# Patient Record
Sex: MALE | Race: BLACK OR AFRICAN AMERICAN | NOT HISPANIC OR LATINO | Employment: OTHER | URBAN - METROPOLITAN AREA
[De-identification: names, ages, dates, MRNs, and addresses within clinical notes are randomized per-mention and may not be internally consistent; named-entity substitution may affect disease eponyms.]

---

## 2017-05-25 ENCOUNTER — HISTORICAL (OUTPATIENT)
Dept: ADMINISTRATIVE | Facility: HOSPITAL | Age: 62
End: 2017-05-25

## 2018-04-25 ENCOUNTER — HISTORICAL (OUTPATIENT)
Dept: ADMINISTRATIVE | Facility: HOSPITAL | Age: 63
End: 2018-04-25

## 2018-04-25 LAB
ABS NEUT (OLG): 6.7
ALBUMIN SERPL-MCNC: 4.6 GM/DL (ref 3.4–5)
ALBUMIN/GLOB SERPL: 1.92 {RATIO} (ref 1.5–2.5)
ALP SERPL-CCNC: 73 UNIT/L (ref 38–126)
ALT SERPL-CCNC: 19 UNIT/L (ref 7–52)
AST SERPL-CCNC: 19 UNIT/L (ref 15–37)
BILIRUB SERPL-MCNC: 0.6 MG/DL (ref 0.2–1)
BILIRUBIN DIRECT+TOT PNL SERPL-MCNC: 0.2 MG/DL (ref 0–0.5)
BUN SERPL-MCNC: 19 MG/DL (ref 7–18)
CALCIUM SERPL-MCNC: 9.2 MG/DL (ref 8.5–10)
CHLORIDE SERPL-SCNC: 107 MMOL/L (ref 98–107)
CHOLEST SERPL-MCNC: 114 MG/DL (ref 0–200)
CHOLEST/HDLC SERPL: 3.4 {RATIO}
CO2 SERPL-SCNC: 24 MMOL/L (ref 21–32)
CREAT SERPL-MCNC: 0.77 MG/DL (ref 0.6–1.3)
CREAT UR-MCNC: 200 MG/DL
CREAT/UREA NIT SERPL: 24.7
ERYTHROCYTE [DISTWIDTH] IN BLOOD BY AUTOMATED COUNT: 14.7 % (ref 11.5–17)
EST. AVERAGE GLUCOSE BLD GHB EST-MCNC: 117 MG/DL
GGT SERPL-CCNC: 25 UNIT/L (ref 5–85)
GLOBULIN SER-MCNC: 2.4 GM/DL (ref 1.2–3)
GLUCOSE SERPL-MCNC: 95 MG/DL (ref 74–106)
HBA1C MFR BLD: 5.7 % (ref 4.4–6.4)
HCT VFR BLD AUTO: 40.7 % (ref 42–52)
HDLC SERPL-MCNC: 34 MG/DL (ref 35–60)
HGB BLD-MCNC: 14 GM/DL (ref 14–18)
LDH SERPL-CCNC: 122 UNIT/L (ref 140–271)
LDLC SERPL CALC-MCNC: 58 MG/DL (ref 0–129)
LYMPHOCYTES # BLD AUTO: 2.3 X10(3)/MCL (ref 0.6–3.4)
LYMPHOCYTES NFR BLD AUTO: 23.7 % (ref 13–40)
MCH RBC QN AUTO: 27 PG (ref 27–31.2)
MCHC RBC AUTO-ENTMCNC: 34 GM/DL (ref 32–36)
MCV RBC AUTO: 79 FL (ref 80–94)
MICROALBUMIN UR-MCNC: 10 MG/L
MICROALBUMIN/CREAT RATIO PNL UR: <30 MG/GM
MONOCYTES # BLD AUTO: 0.9 X10(3)/MCL (ref 0–1.8)
MONOCYTES NFR BLD AUTO: 9.2 % (ref 0.1–24)
NEUTROPHILS NFR BLD AUTO: 67.1 % (ref 47–80)
PLATELET # BLD AUTO: 274 X10(3)/MCL (ref 130–400)
PMV BLD AUTO: 10.6 FL
POTASSIUM SERPL-SCNC: 4 MMOL/L (ref 3.5–5.1)
PROT SERPL-MCNC: 7 GM/DL (ref 6.4–8.2)
RBC # BLD AUTO: 5.18 X10(6)/MCL (ref 4.7–6.1)
SODIUM SERPL-SCNC: 137 MMOL/L (ref 136–145)
TRIGL SERPL-MCNC: 48 MG/DL (ref 30–150)
VLDLC SERPL CALC-MCNC: 9.6 MG/DL
WBC # SPEC AUTO: 9.9 X10(3)/MCL (ref 4.5–11.5)

## 2018-05-24 ENCOUNTER — HISTORICAL (OUTPATIENT)
Dept: ADMINISTRATIVE | Facility: HOSPITAL | Age: 63
End: 2018-05-24

## 2018-05-24 LAB
ABS NEUT (OLG): 6.4
ERYTHROCYTE [DISTWIDTH] IN BLOOD BY AUTOMATED COUNT: 15 % (ref 11.5–17)
HCT VFR BLD AUTO: 41.6 % (ref 42–52)
HGB BLD-MCNC: 14 GM/DL (ref 14–18)
LYMPHOCYTES # BLD AUTO: 2.2 X10(3)/MCL (ref 0.6–3.4)
LYMPHOCYTES NFR BLD AUTO: 22 % (ref 13–40)
MCH RBC QN AUTO: 26.5 PG (ref 27–31.2)
MCHC RBC AUTO-ENTMCNC: 34 GM/DL (ref 32–36)
MCV RBC AUTO: 79 FL (ref 80–94)
MONOCYTES # BLD AUTO: 1.2 X10(3)/MCL (ref 0–1.8)
MONOCYTES NFR BLD AUTO: 12.2 % (ref 0.1–24)
NEUTROPHILS NFR BLD AUTO: 65.8 % (ref 47–80)
PLATELET # BLD AUTO: 309 X10(3)/MCL (ref 130–400)
PMV BLD AUTO: 10.1 FL
RBC # BLD AUTO: 5.28 X10(6)/MCL (ref 4.7–6.1)
WBC # SPEC AUTO: 9.8 X10(3)/MCL (ref 4.5–11.5)

## 2019-05-22 ENCOUNTER — HISTORICAL (OUTPATIENT)
Dept: ADMINISTRATIVE | Facility: HOSPITAL | Age: 64
End: 2019-05-22

## 2019-05-22 LAB
ABS NEUT (OLG): 7.1 X10(3)/MCL (ref 2.1–9.2)
ALBUMIN SERPL-MCNC: 4.4 GM/DL (ref 3.4–5)
ALBUMIN/GLOB SERPL: 1.63 {RATIO} (ref 1.5–2.5)
ALP SERPL-CCNC: 69 UNIT/L (ref 38–126)
ALT SERPL-CCNC: 19 UNIT/L (ref 7–52)
APPEARANCE, UA: CLEAR
AST SERPL-CCNC: 25 UNIT/L (ref 15–37)
BACTERIA #/AREA URNS AUTO: NORMAL /HPF
BILIRUB SERPL-MCNC: 0.5 MG/DL (ref 0.2–1)
BILIRUB UR QL STRIP: NEGATIVE MG/DL
BILIRUBIN DIRECT+TOT PNL SERPL-MCNC: 0.2 MG/DL (ref 0–0.5)
BILIRUBIN DIRECT+TOT PNL SERPL-MCNC: 0.3 MG/DL
BUN SERPL-MCNC: 16 MG/DL (ref 7–18)
CALCIUM SERPL-MCNC: 9.2 MG/DL (ref 8.5–10)
CHLORIDE SERPL-SCNC: 105 MMOL/L (ref 98–107)
CHOLEST SERPL-MCNC: 109 MG/DL (ref 0–200)
CHOLEST/HDLC SERPL: 3.1 {RATIO}
CO2 SERPL-SCNC: 26 MMOL/L (ref 21–32)
COLOR UR: YELLOW
CREAT SERPL-MCNC: 0.86 MG/DL (ref 0.6–1.3)
CREAT UR-MCNC: 200 MG/DL
ERYTHROCYTE [DISTWIDTH] IN BLOOD BY AUTOMATED COUNT: 15.2 % (ref 11.5–17)
EST. AVERAGE GLUCOSE BLD GHB EST-MCNC: 111 MG/DL
GLOBULIN SER-MCNC: 2.7 GM/DL (ref 1.2–3)
GLUCOSE (UA): NEGATIVE MG/DL
GLUCOSE SERPL-MCNC: 95 MG/DL (ref 74–106)
HBA1C MFR BLD: 5.5 % (ref 4.4–6.4)
HCT VFR BLD AUTO: 41 % (ref 42–52)
HDLC SERPL-MCNC: 35 MG/DL (ref 35–60)
HGB BLD-MCNC: 14 GM/DL (ref 14–18)
HGB UR QL STRIP: NEGATIVE UNIT/L
KETONES UR QL STRIP: NEGATIVE MG/DL
LDLC SERPL CALC-MCNC: 57 MG/DL (ref 0–129)
LEUKOCYTE ESTERASE UR QL STRIP: NEGATIVE UNIT/L
LYMPHOCYTES # BLD AUTO: 1.9 X10(3)/MCL (ref 0.6–3.4)
LYMPHOCYTES NFR BLD AUTO: 18.5 % (ref 13–40)
MCH RBC QN AUTO: 26.3 PG (ref 27–31.2)
MCHC RBC AUTO-ENTMCNC: 34 GM/DL (ref 32–36)
MCV RBC AUTO: 77 FL (ref 80–94)
MICROALBUMIN UR-MCNC: 10 MG/L
MICROALBUMIN/CREAT RATIO PNL UR: <30 MG/GM
MONOCYTES # BLD AUTO: 1.1 X10(3)/MCL (ref 0.1–1.3)
MONOCYTES NFR BLD AUTO: 11.2 % (ref 0.1–24)
NEUTROPHILS NFR BLD AUTO: 70.3 % (ref 47–80)
NITRITE UR QL STRIP.AUTO: NEGATIVE
PH UR STRIP: 5 [PH]
PLATELET # BLD AUTO: 281 X10(3)/MCL (ref 130–400)
PMV BLD AUTO: 10.3 FL (ref 9.4–12.4)
POTASSIUM SERPL-SCNC: 4.6 MMOL/L (ref 3.5–5.1)
PROT SERPL-MCNC: 7.1 GM/DL (ref 6.4–8.2)
PROT UR QL STRIP: NEGATIVE MG/DL
PSA SERPL-MCNC: 0.55 NG/ML (ref 0–4.5)
RBC # BLD AUTO: 5.33 X10(6)/MCL (ref 4.7–6.1)
RBC #/AREA URNS HPF: NORMAL /HPF
SODIUM SERPL-SCNC: 138 MMOL/L (ref 136–145)
SP GR UR STRIP: 1.02
SQUAMOUS EPITHELIAL, UA: NORMAL /LPF
TRIGL SERPL-MCNC: 51 MG/DL (ref 30–150)
TSH SERPL-ACNC: 0.98 MIU/ML (ref 0.35–4.94)
UROBILINOGEN UR STRIP-ACNC: 0.2 MG/DL
VLDLC SERPL CALC-MCNC: 10.2 MG/DL
WBC # SPEC AUTO: 10.1 X10(3)/MCL (ref 4.5–11.5)
WBC #/AREA URNS AUTO: NORMAL /[HPF]

## 2019-08-14 ENCOUNTER — HISTORICAL (OUTPATIENT)
Dept: ADMINISTRATIVE | Facility: HOSPITAL | Age: 64
End: 2019-08-14

## 2020-06-19 ENCOUNTER — HISTORICAL (OUTPATIENT)
Dept: ADMINISTRATIVE | Facility: HOSPITAL | Age: 65
End: 2020-06-19

## 2020-06-19 LAB
ABS NEUT (OLG): 7.1 X10(3)/MCL (ref 2.1–9.2)
ALBUMIN SERPL-MCNC: 4.8 GM/DL (ref 3.4–5)
ALBUMIN/GLOB SERPL: 1.85 {RATIO} (ref 1.5–2.5)
ALP SERPL-CCNC: 71 UNIT/L (ref 38–126)
ALT SERPL-CCNC: 33 UNIT/L (ref 7–52)
APPEARANCE, UA: CLEAR
AST SERPL-CCNC: 35 UNIT/L (ref 15–37)
BACTERIA #/AREA URNS AUTO: NORMAL /HPF
BILIRUB SERPL-MCNC: 0.6 MG/DL (ref 0.2–1)
BILIRUB UR QL STRIP: NEGATIVE MG/DL
BILIRUBIN DIRECT+TOT PNL SERPL-MCNC: 0.2 MG/DL (ref 0–0.5)
BILIRUBIN DIRECT+TOT PNL SERPL-MCNC: 0.4 MG/DL
BUN SERPL-MCNC: 25 MG/DL (ref 7–18)
CALCIUM SERPL-MCNC: 9.6 MG/DL (ref 8.5–10)
CHLORIDE SERPL-SCNC: 102 MMOL/L (ref 98–107)
CHOLEST SERPL-MCNC: 121 MG/DL (ref 0–200)
CHOLEST/HDLC SERPL: 2.9 {RATIO}
CO2 SERPL-SCNC: 21 MMOL/L (ref 21–32)
COLOR UR: YELLOW
CREAT SERPL-MCNC: 1.06 MG/DL (ref 0.6–1.3)
CREAT UR-MCNC: 50 MG/DL
ERYTHROCYTE [DISTWIDTH] IN BLOOD BY AUTOMATED COUNT: 14.9 % (ref 11.5–17)
EST. AVERAGE GLUCOSE BLD GHB EST-MCNC: 111 MG/DL
GLOBULIN SER-MCNC: 2.6 GM/DL (ref 1.2–3)
GLUCOSE (UA): NEGATIVE MG/DL
GLUCOSE SERPL-MCNC: 106 MG/DL (ref 74–106)
HBA1C MFR BLD: 5.5 % (ref 4.4–6.4)
HCT VFR BLD AUTO: 38.2 % (ref 42–52)
HDLC SERPL-MCNC: 42 MG/DL (ref 35–60)
HGB BLD-MCNC: 13.2 GM/DL (ref 14–18)
HGB UR QL STRIP: NEGATIVE UNIT/L
KETONES UR QL STRIP: NEGATIVE MG/DL
LDLC SERPL CALC-MCNC: 65 MG/DL (ref 0–129)
LEUKOCYTE ESTERASE UR QL STRIP: NEGATIVE UNIT/L
LYMPHOCYTES # BLD AUTO: 2.3 X10(3)/MCL (ref 0.6–3.4)
LYMPHOCYTES NFR BLD AUTO: 22.2 % (ref 13–40)
MCH RBC QN AUTO: 26.5 PG (ref 27–31.2)
MCHC RBC AUTO-ENTMCNC: 35 GM/DL (ref 32–36)
MCV RBC AUTO: 77 FL (ref 80–94)
MICROALBUMIN UR-MCNC: 10 MG/L
MICROALBUMIN/CREAT RATIO PNL UR: <30 MG/GM
MONOCYTES # BLD AUTO: 1 X10(3)/MCL (ref 0.1–1.3)
MONOCYTES NFR BLD AUTO: 9.7 % (ref 0.1–24)
NEUTROPHILS NFR BLD AUTO: 68.1 % (ref 47–80)
NITRITE UR QL STRIP.AUTO: NEGATIVE
PH UR STRIP: 5.5 [PH]
PLATELET # BLD AUTO: 359 X10(3)/MCL (ref 130–400)
PMV BLD AUTO: 9.5 FL (ref 9.4–12.4)
POTASSIUM SERPL-SCNC: 4.5 MMOL/L (ref 3.5–5.1)
PROT SERPL-MCNC: 7.4 GM/DL (ref 6.4–8.2)
PROT UR QL STRIP: NEGATIVE MG/DL
PSA SERPL-MCNC: 0.29 NG/ML (ref 0–4.5)
RBC # BLD AUTO: 4.99 X10(6)/MCL (ref 4.7–6.1)
RBC #/AREA URNS HPF: NORMAL /HPF
SODIUM SERPL-SCNC: 134 MMOL/L (ref 136–145)
SP GR UR STRIP: 1.01
SQUAMOUS EPITHELIAL, UA: NORMAL /LPF
TRIGL SERPL-MCNC: 63 MG/DL (ref 30–150)
TSH SERPL-ACNC: 0.89 MIU/ML (ref 0.35–4.94)
UROBILINOGEN UR STRIP-ACNC: 0.2 MG/DL
VLDLC SERPL CALC-MCNC: 12.6 MG/DL
WBC # SPEC AUTO: 10.4 X10(3)/MCL (ref 4.5–11.5)
WBC #/AREA URNS AUTO: NORMAL /[HPF]

## 2020-12-22 ENCOUNTER — HISTORICAL (OUTPATIENT)
Dept: ADMINISTRATIVE | Facility: HOSPITAL | Age: 65
End: 2020-12-22

## 2020-12-22 LAB
ALBUMIN SERPL-MCNC: 4.4 GM/DL (ref 3.4–5)
ALBUMIN/GLOB SERPL: 1.69 {RATIO} (ref 1.5–2.5)
ALP SERPL-CCNC: 114 UNIT/L (ref 38–126)
ALT SERPL-CCNC: 39 UNIT/L (ref 7–52)
AST SERPL-CCNC: 35 UNIT/L (ref 15–37)
BILIRUB SERPL-MCNC: 0.4 MG/DL (ref 0.2–1)
BILIRUBIN DIRECT+TOT PNL SERPL-MCNC: 0.1 MG/DL (ref 0–0.5)
BILIRUBIN DIRECT+TOT PNL SERPL-MCNC: 0.3 MG/DL
BUN SERPL-MCNC: 10 MG/DL (ref 7–18)
CALCIUM SERPL-MCNC: 9.6 MG/DL (ref 8.5–10.1)
CHLORIDE SERPL-SCNC: 104 MMOL/L (ref 98–107)
CHOLEST SERPL-MCNC: 131 MG/DL (ref 0–200)
CHOLEST/HDLC SERPL: 2.8 {RATIO}
CO2 SERPL-SCNC: 24 MMOL/L (ref 21–32)
CREAT SERPL-MCNC: 0.84 MG/DL (ref 0.6–1.3)
CREAT UR-MCNC: 50 MG/DL
EST CREAT CLEARANCE SER (OHS): 146.02 ML/MIN
EST. AVERAGE GLUCOSE BLD GHB EST-MCNC: 105 MG/DL
GLOBULIN SER-MCNC: 2.6 GM/DL (ref 1.2–3)
GLUCOSE SERPL-MCNC: 110 MG/DL (ref 74–106)
HBA1C MFR BLD: 5.3 % (ref 4.4–6.4)
HDLC SERPL-MCNC: 47 MG/DL (ref 35–60)
LDLC SERPL CALC-MCNC: 71 MG/DL (ref 0–129)
MICROALBUMIN UR-MCNC: 10 MG/L
MICROALBUMIN/CREAT RATIO PNL UR: <30 MG/GM
POTASSIUM SERPL-SCNC: 3.9 MMOL/L (ref 3.5–5.1)
PROT SERPL-MCNC: 7 GM/DL (ref 6.4–8.2)
SODIUM SERPL-SCNC: 139 MMOL/L (ref 136–145)
TRIGL SERPL-MCNC: 108 MG/DL (ref 30–150)
TSH SERPL-ACNC: 1.16 MIU/ML (ref 0.35–4.94)
VLDLC SERPL CALC-MCNC: 21.6 MG/DL

## 2021-12-13 ENCOUNTER — HISTORICAL (OUTPATIENT)
Dept: ADMINISTRATIVE | Facility: HOSPITAL | Age: 66
End: 2021-12-13

## 2021-12-13 LAB
ABS NEUT (OLG): 8.8 X10(3)/MCL (ref 2.1–9.2)
ALBUMIN SERPL-MCNC: 4.3 GM/DL (ref 3.4–5)
ALBUMIN/GLOB SERPL: 1.54 {RATIO} (ref 1.5–2.5)
ALP SERPL-CCNC: 82 UNIT/L (ref 38–126)
ALT SERPL-CCNC: 30 UNIT/L (ref 7–52)
APPEARANCE, UA: CLEAR
AST SERPL-CCNC: 34 UNIT/L (ref 15–37)
BACTERIA #/AREA URNS AUTO: NORMAL /HPF
BILIRUB SERPL-MCNC: 0.5 MG/DL (ref 0.2–1)
BILIRUB UR QL STRIP: NEGATIVE MG/DL
BILIRUBIN DIRECT+TOT PNL SERPL-MCNC: 0.1 MG/DL (ref 0–0.5)
BILIRUBIN DIRECT+TOT PNL SERPL-MCNC: 0.4 MG/DL
BUN SERPL-MCNC: 9 MG/DL (ref 7–18)
CALCIUM SERPL-MCNC: 9.2 MG/DL (ref 8.5–10.1)
CHLORIDE SERPL-SCNC: 98 MMOL/L (ref 98–107)
CHOLEST SERPL-MCNC: 152 MG/DL (ref 0–200)
CHOLEST/HDLC SERPL: 3 {RATIO}
CO2 SERPL-SCNC: 25 MMOL/L (ref 21–32)
COLOR UR: YELLOW
CREAT SERPL-MCNC: 0.85 MG/DL (ref 0.6–1.3)
CREAT UR-MCNC: 100 MG/DL
DEPRECATED CALCIDIOL+CALCIFEROL SERPL-MC: 13 NG/ML (ref 30–80)
ERYTHROCYTE [DISTWIDTH] IN BLOOD BY AUTOMATED COUNT: 13.8 % (ref 11.5–17)
EST. AVERAGE GLUCOSE BLD GHB EST-MCNC: 111 MG/DL
GLOBULIN SER-MCNC: 2.8 GM/DL (ref 1.2–3)
GLUCOSE (UA): NEGATIVE MG/DL
GLUCOSE SERPL-MCNC: 106 MG/DL (ref 74–106)
HBA1C MFR BLD: 5.5 % (ref 4.4–6.4)
HCT VFR BLD AUTO: 40.8 % (ref 42–52)
HDLC SERPL-MCNC: 50 MG/DL (ref 35–60)
HGB BLD-MCNC: 13.7 GM/DL (ref 14–18)
HGB UR QL STRIP: NEGATIVE UNIT/L
KETONES UR QL STRIP: NORMAL MG/DL
LDLC SERPL CALC-MCNC: 89 MG/DL (ref 0–129)
LEUKOCYTE ESTERASE UR QL STRIP: NEGATIVE UNIT/L
LYMPHOCYTES # BLD AUTO: 2.3 X10(3)/MCL (ref 0.6–3.4)
LYMPHOCYTES NFR BLD AUTO: 19.5 % (ref 13–40)
MCH RBC QN AUTO: 26.4 PG (ref 27–31.2)
MCHC RBC AUTO-ENTMCNC: 34 GM/DL (ref 32–36)
MCV RBC AUTO: 79 FL (ref 80–94)
MICROALBUMIN UR-MCNC: 10 MG/L
MICROALBUMIN/CREAT RATIO PNL UR: <30 MG/GM
MONOCYTES # BLD AUTO: 0.5 X10(3)/MCL (ref 0.1–1.3)
MONOCYTES NFR BLD AUTO: 4.3 % (ref 0.1–24)
NEUTROPHILS NFR BLD AUTO: 76.2 % (ref 47–80)
NITRITE UR QL STRIP.AUTO: NEGATIVE
PH UR STRIP: 5.5 [PH]
PLATELET # BLD AUTO: 483 X10(3)/MCL (ref 130–400)
PMV BLD AUTO: 9.8 FL (ref 9.4–12.4)
POTASSIUM SERPL-SCNC: 4.3 MMOL/L (ref 3.5–5.1)
PROT SERPL-MCNC: 7.1 GM/DL (ref 6.4–8.2)
PROT UR QL STRIP: NEGATIVE MG/DL
PSA SERPL-MCNC: 0.42 NG/ML (ref 0–4.5)
RBC # BLD AUTO: 5.19 X10(6)/MCL (ref 4.7–6.1)
RBC #/AREA URNS HPF: NORMAL /HPF
SODIUM SERPL-SCNC: 137 MMOL/L (ref 136–145)
SP GR UR STRIP: 1.01
SQUAMOUS EPITHELIAL, UA: NORMAL /LPF
TRIGL SERPL-MCNC: 67 MG/DL (ref 30–150)
TSH SERPL-ACNC: 1.14 MIU/ML (ref 0.35–4.94)
UROBILINOGEN UR STRIP-ACNC: 0.2 MG/DL
VLDLC SERPL CALC-MCNC: 13.4 MG/DL
WBC # SPEC AUTO: 11.6 X10(3)/MCL (ref 4.5–11.5)
WBC #/AREA URNS AUTO: NORMAL /[HPF]

## 2022-04-10 ENCOUNTER — HISTORICAL (OUTPATIENT)
Dept: ADMINISTRATIVE | Facility: HOSPITAL | Age: 67
End: 2022-04-10

## 2022-04-26 LAB — CRC RECOMMENDATION EXT: NORMAL

## 2022-04-27 VITALS
WEIGHT: 259.5 LBS | DIASTOLIC BLOOD PRESSURE: 105 MMHG | SYSTOLIC BLOOD PRESSURE: 190 MMHG | BODY MASS INDEX: 36.33 KG/M2 | HEIGHT: 71 IN

## 2022-05-03 NOTE — HISTORICAL [facility] CERNER
This is a historical note converted from [employer]. Formatting and pictures may have been removed.  Please reference [facility] for original formatting and attached multimedia. Chief Complaint  6 MTH RECHECK/ HES COMPLETELY OUT OF MEDS SINCE FRIDAY  History of Present Illness  Patient presents for 6-month follow-up.  Ran out of BP med 4 days ago. BP had been normal when on med.  No recent exercise. Still unemployed. Drinking up to 6 beers a day.  ?  Review of Systems  General:???????????Having insomnia. Responds to 1/2 [employer] tab.????  HEENT:?????????????????? Denies vision changes or eye pain.? No sore throat, ear pain, sinus pressure or discharge.  Cardiovascular:??? Denies chest pain, palpitations, dyspnea on exertion, orthopnea.  Respiratory:???????? No cough, wheezing, shortness of breath, or sputum.  GI:????????????????????????? Denies nausea, emesis, constipation, diarrhea, melena, hematochezia or abdominal pain  :??????????????????????? No frequency, urgency, hematuria, discharge, or incontinence  MS:?????????????????????? Denies myalgias, arthralgias, joint effusion, edema, or weakness  Neuro:????????????????? No headaches, numbness in extremities, tingling, dizziness, or weakness  Psych:?????????????????? Denies anxiety, depression, suicidal or homicidal ideations, or irritability  Endo:??????????????????? Denies polyuria, polydipsia, polyphagia  Heme:????????????????? No abnormal bleeding or bruising. No lymph node enlargement or pain.  Physical Exam  Vitals & Measurements  HR:?124(Peripheral)? BP:?190/85?  HT:?180.00?cm? HT:?180?cm? WT:?116.200?kg? WT:?116.2?kg? BMI:?35.86?  Pulse of?90 on my recheck.  General :?????Well-developed obese male in no apparent distress, alert and oriented ?4  Neck :?????Supple, no lymphadenopathy, no thyromegaly, no bruits, no jugular venous distention  Cardiovascular :?????? Regular rhythm and rate, no murmurs, radial and dorsal pedal pulses 2+ bilaterally  Respiratory  :??????Lungs clear to auscultation bilaterally, no wheezes, no crackles, no rhonchi.? Good air movement  Abdomen :?????? ?NABS, soft, nontender, no hepatosplenomegaly, no masses, no guarding or rebound????????????????????????  MS :??????? No muscle tenderness, joints WNL, FROM, negative SLR, no?CCE  Neuro :? ?Grossly intact  Heme :?????? No bruising or petechiae?  Back:??????? no CVAT  Assessment/Plan  1.?DM2 (diabetes mellitus, type 2)?E11.9  ?We will check an A1c and microalbumin. ?Refilled Metformin?500 mg twice daily for 6 months.  2.?Hypertension?I10  Refilled amlodipine 5 mg and?olmesartan 40 mg?for 6 months  3.?Hypercholesterolemia?E78.00  ?We will check a CMP and lipids. ?Refilled rosuvastatin 20 mg for 6 months  4.?Tachycardia?R00.0  ?Asymptomatic and improved on recheck. ?Patient is?advised to contact us if his?heart rate?is?consistently greater than 100 or?is symptomatic.  5.?Osteoarthritis of AC (acromioclavicular) joint?M19.019  ?Refill meloxicam for 6 months.  Referrals  Clinic Follow up, *Est. 06/25/21 8:45:00 CDT, CPX in 6 months, Order for future visit, Tachycardia, [facility]   Problem List/Past Medical History  Ongoing  DM2 (diabetes mellitus, type 2)  Headache  Hypercholesterolemia  Hypertension  Microcytosis  Obesity  Osteoarthritis of AC (acromioclavicular) joint  Pneumonia  Tachycardia  Verruca  Wellness examination  Historical  Able to lie down  Activity tolerance  Cataracts  Diabetes  Glasses  Procedure/Surgical History  43487 - LT CATARACT W/IOL 1 STA PHACO (Left) (05/25/2017)  Extracapsular cataract removal with insertion of intraocular lens prosthesis (1 stage procedure), manual or mechanical technique (eg, irrigation and aspiration or phacoemulsification) (05/25/2017)  Replacement of Left Lens with Synthetic Substitute, Percutaneous Approach (05/25/2017)  Colonoscopy (04/13/2017)  27127 - RT CATARACT W/IOL 1 STA PHACO (Right) (12/08/2015)  Extracapsular cataract removal with insertion of  intraocular lens prosthesis (1 stage procedure), manual or mechanical technique (eg, irrigation and aspiration or phacoemulsification) (12/08/2015)  Replacement of Right Lens with Synthetic Substitute, Percutaneous Approach (12/08/2015)  Lumpectomy (1996)   Medications  amlodipine 5 mg oral tablet, See Instructions, 1 refills  aspirin 81 mg oral tablet, Daily  meloxicam 15 mg oral tablet, See Instructions, 1 refills  metformin 500 mg oral tablet, See Instructions, 1 refills  olmesartan 40 mg oral tablet, See Instructions, 1 refills  rosuvastatin 20 mg oral tablet, See Instructions, 1 refills  sildenafil 20 mg oral tablet, See Instructions, 11 refills  Allergies  No Known Medication Allergies  Social History  Abuse/Neglect  No, 12/22/2020  No, 06/19/2020  No, 08/14/2019  Alcohol - Denies Alcohol Use, 12/02/2015  Past, 05/23/2017  Employment/School - Low Risk, 12/02/2015  Employed, Work/School description: [employer] 12/02/2015  Home/Environment - Low Risk, 12/02/2015  Lives with Spouse., 12/02/2015  Substance Use - Denies Substance Abuse, 12/02/2015  Never, 05/25/2017  Tobacco - Denies Tobacco Use, 12/02/2015  Former smoker, quit more than 30 days ago, No, 12/22/2020  Former smoker, quit more than 30 days ago, No, 06/19/2020  Former smoker, quit more than 30 days ago, No, 08/14/2019  Family History  Hypertension.: Father.  Stroke.: Father.  Immunizations  Vaccine Date Status   pneumococcal 13-valent conjugate vaccine 11/02/2020 Recorded   influenza virus vaccine, inactivated 11/02/2020 Recorded   Health Maintenance  Health Maintenance  ???Pending?(in the next year)  ??? ??OverDue  ??? ? ? ?Aspirin Therapy for CVD Prevention due??05/23/18??and every 1??year(s)  ??? ? ? ?Alcohol Misuse Screening due??01/02/20??and every 1??year(s)  ??? ??Due?  ??? ? ? ?ADL Screening due??12/26/20??and every 1??year(s)  ??? ? ? ?Depression Screening due??12/26/20??Unknown Frequency  ??? ? ? ?Diabetes Maintenance-Foot Exam  due??12/26/20??Unknown Frequency  ??? ? ? ?Tetanus Vaccine due??12/26/20??and every 10??year(s)  ??? ? ? ?Zoster Vaccine due??12/26/20??Unknown Frequency  ??? ??Due In Future?  ??? ? ? ?Obesity Screening not due until??01/01/21??and every 1??year(s)  ??? ? ? ?Diabetes Maintenance-Eye Exam not due until??03/16/21??and every 1??year(s)  ??? ? ? ?Influenza Vaccine not due until??10/01/21??and every 1??day(s)  ??? ? ? ?Blood Pressure Screening not due until??12/22/21??and every 1??year(s)  ??? ? ? ?Body Mass Index Check not due until??12/22/21??and every 1??year(s)  ??? ? ? ?Diabetes Maintenance-HgbA1c not due until??12/22/21??and every 1??year(s)  ??? ? ? ?Hypertension Management-BMP not due until??12/22/21??and every 1??year(s)  ??? ? ? ?Hypertension Management-Blood Pressure not due until??12/22/21??and every 1??year(s)  ??? ? ? ?Diabetes Maintenance-Fasting Lipid Profile not due until??12/22/21??and every 1??year(s)  ??? ? ? ?Diabetes Maintenance-Serum Creatinine not due until??12/22/21??and every 1??year(s)  ???Satisfied?(in the past 1 year)  ??? ??Satisfied?  ??? ? ? ?Blood Pressure Screening on??12/22/20.??Satisfied by Julisa Abbott CMA.  ??? ? ? ?Body Mass Index Check on??12/22/20.??Satisfied by Julisa Abbott CMA.  ??? ? ? ?Diabetes Maintenance-Eye Exam on??03/16/20.??Satisfied by Stefania Sanchez  ??? ? ? ?Diabetes Maintenance-Fasting Lipid Profile on??12/22/20.??Satisfied by Alcira Adkins  ??? ? ? ?Diabetes Maintenance-HgbA1c on??12/22/20.??Satisfied by Cj Novoa  ??? ? ? ?Diabetes Maintenance-Microalbumin on??12/22/20.??Satisfied by Cj Novoa  ??? ? ? ?Diabetes Maintenance-Serum Creatinine on??12/22/20.??Satisfied by Alcira Adkins  ??? ? ? ?Diabetes Screening on??12/22/20.??Satisfied by Alcira Adkins  ??? ? ? ?Hypertension Management-BMP on??12/22/20.??Satisfied by Alcira Adkins  ??? ? ? ?Hypertension Management-Blood Pressure on??12/22/20.??Satisfied by Scar POND,  Julisa ORTEGA  ??? ? ? ?Influenza Vaccine on??11/02/20.??Satisfied by Julisa Abbott CMA  ??? ? ? ?Lipid Screening on??12/22/20.??Satisfied by Alcria Adkins  ??? ? ? ?Obesity Screening on??12/22/20.??Satisfied by Julisa Abbott CMA  ?

## 2022-05-03 NOTE — HISTORICAL [facility] CERNER
This is a historical note converted from [employer]. Formatting and pictures may have been removed.  Please reference [facility] for original formatting and attached multimedia. Chief Complaint  PT C/O ST, CCC, ITCHY EYES, ITCHING SENSATION ALL OVER SKI N PT WAS EXPOSED TO BLACK MOLD AT WORK LAST WEEK  History of Present Illness  See chief complaint. ?Symptoms started 1 week ago after spraying clorox on a fungus. Emesis once.  Review of Systems  HEENT:?????????????????? Denies vision changes or eye pain.? No sore throat, ear pain, sinus pressure or discharge.  Cardiovascular:??? Denies chest pain, palpitations, dyspnea on exertion, orthopnea.  Respiratory:??????See HPI  GI:?????????????????????????Currently without?nausea, emesis, constipation, diarrhea, melena, hematochezia or abdominal pain  :??????????????????????? No frequency, urgency, hematuria, discharge, or incontinence  MS:?????????????????????? Denies myalgias, arthralgias, joint effusion, edema, or weakness  Neuro:????????????????? No headaches, numbness in extremities, tingling, dizziness, or weakness  Psych:?????????????????? Denies anxiety, depression, suicidal or homicidal ideations, or irritability  Endo:??????????????????? Denies polyuria, polydipsia, polyphagia  Heme:????????????????? No abnormal bleeding or bruising. No lymph node enlargement or pain.?  Physical Exam  Vitals & Measurements  T:?36.9? ?C (Oral)? HR:?115(Peripheral)? BP:?158/68?  HT:?180?cm? HT:?180?cm? HT:?180?cm? WT:?116?kg? WT:?116?kg? WT:?116?kg? BMI:?35.8?  General:?Wet cough, alert and oriented, stable on room air. pulse ox of 90%  Lungs:?Moderate rhonchi, no wheeze, no crackles, good air movement  CV: RRR, no murmur  ABD:?Benign  Back: No CVAT  Ears:?TMs and EACs within normal limits bilaterally  Nose: Erythema?and edematous turbinates with purulent discharge, sinus tenderness  OP:?Erythema, no exudate  Neck: Supple,?no LAD  est x-ray:?Suspicious for pleural effusion versus  infiltrate right lower lung  CBC:?No significant findings  Assessment/Plan  1.?Shortness of breath  ?Patient with pulse ox 98% on room air.? Advised to go to ER if worsens,?however I?expect?patient to improve?with antibiotics.  2.?Pneumonia  ?Treat with Levaquin 500 mg?for 10 days  3.?Sinusitis  ?Celestone 2 cc IM  4.?Headache  ?Advised to increase hydration  Place order for?labs on chart for CPX in November   Problem List/Past Medical History  Ongoing  DM2 (diabetes mellitus, type 2)  Headache  Microcytosis  Obesity  Osteoarthritis of AC (acromioclavicular) joint  Pneumonia  Shortness of breath  Sinusitis  Verruca  Historical  Able to lie down  Activity tolerance  Cataracts  Diabetes  Glasses  Hypercholesterolemia  Hypertension  Procedure/Surgical History  05186 - LT CATARACT W/IOL 1 STA PHACO (Left) (05/25/2017), Extracapsular cataract removal with insertion of intraocular lens prosthesis (1 stage procedure), manual or mechanical technique (eg, irrigation and aspiration or phacoemulsification) (05/25/2017), Replacement of Left Lens with Synthetic Substitute, Percutaneous Approach (05/25/2017), 75447 - RT CATARACT W/IOL 1 STA PHACO (Right) (12/08/2015), Extracapsular cataract removal with insertion of intraocular lens prosthesis (1 stage procedure), manual or mechanical technique (eg, irrigation and aspiration or phacoemulsification) (12/08/2015), Replacement of Right Lens with Synthetic Substitute, Percutaneous Approach (12/08/2015), Lumpectomy (1996), Colonoscopy.  Medications  amlodipine 5 mg oral tablet, 5 mg= 1 tab(s), Oral, Daily, 1 refills  aspirin 81 mg oral tablet, Daily  DUREZOL .05 % EMUL, 1 drop(s), Eye-Both, QID  Levaquin 500 mg oral tablet, 500 mg= 1 tab(s), Oral, q24hr  LOTEMAX .5 % SUSP  meloxicam 15 mg oral tablet, 15 mg= 1 tab(s), Oral, Daily, 1 refills  metformin 500 mg oral tablet, 500 mg= 1 tab(s), Oral, BID, 1 refills  OFLOXACIN .3 % SOLN  olmesartan 40 mg oral tablet, 40 mg= 1 tab(s), Oral,  Daily, 1 refills  PROLENSA 0.07% OPTH SOLUTION 3ML  rosuvastatin 20 mg oral tablet, 20 mg= 1 tab(s), Oral, Once a day (at bedtime), 1 refills  Sildenafil 20 Mg Tablet  Zithromax Z-Braden 250 mg oral tablet, See Instructions  Allergies  No Known Medication Allergies  Social History  Alcohol - Denies Alcohol Use, 12/02/2015  Past, 05/23/2017  Employment/School - Low Risk, 12/02/2015  Employed, Work/School description: [employer] 12/02/2015  Home/Environment - Low Risk, 12/02/2015  Lives with Spouse., 12/02/2015  Substance Abuse - Denies Substance Abuse, 12/02/2015  Never, 05/25/2017  Tobacco - Denies Tobacco Use, 12/02/2015  Former smoker, Cigarettes, 12/02/2015  Family History  Hypertension.: Father.  Stroke.: Father.

## 2022-05-03 NOTE — HISTORICAL [facility] CERNER
This is a historical note converted from [employer]. Formatting and pictures may have been removed.  Please reference [facility] for original formatting and attached multimedia. 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 HAS HX ARTHRITIS RT SHOULDER  History of Present Illness  Patient c/o worsening right shoulder pain.Exacerbated when lifting hand above his head. Also, effects positions that he can sleep. 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 Other platforms are not a problem.  Takes daily Meloxicam, helps somewhat.  Review of Systems  See HPI. ?Denies cardiac or respiratory complaints. ?Denies GI or  complaints.  Physical Exam  Vitals & Measurements  HR:?126(Peripheral)? BP:?168/80?  HT:?180?cm? WT:?112?kg? BMI:?34.57?  /70 on my check.  General: Patient is alert and oriented  Neck:?No LAD, no bruits, no masses,?trapezius spasm.  EXT:?Tender to palpation of AC joints.? Tender to palpation of right deltoid.? Severe tenderness with?abduction.? Rotator cuff intact.? 2+ DP and radial pulses bilaterally.  ABD: Soft, NABS, nontender, no masses  CV: RRR, no murmur  Lungs: CTA B  Back: No CVAT  XR right shoulder :?? Significant spurring of AC joint.  Assessment/Plan  1.?Osteoarthritis of AC (acromioclavicular) joint?M19.019  ?Agree that the OA in his right shoulder will make climbing using a ladder dangerous.  At his request, I will send a letter to his , [place].  2.?Hypertension?I10  ?Says his SBP runs 140 at home, will follow.  Referrals  Clinic Follow-up PRN, 08/14/19 16:41:00 CDT, [facility], Future Order   Problem List/Past Medical History  Ongoing  DM2 (diabetes mellitus, type 2)  Headache  Hypercholesterolemia  Hypertension  Microcytosis  Obesity  Osteoarthritis of AC (acromioclavicular) joint  Pneumonia  Verruca  Historical  Able to lie down  Activity  tolerance  Cataracts  Diabetes  Glasses  Procedure/Surgical History  79173 - LT CATARACT W/IOL 1 STA PHACO (Left) (05/25/2017)  Extracapsular cataract removal with insertion of intraocular lens prosthesis (1 stage procedure), manual or mechanical technique (eg, irrigation and aspiration or phacoemulsification) (05/25/2017)  Replacement of Left Lens with Synthetic Substitute, Percutaneous Approach (05/25/2017)  Colonoscopy (04/13/2017)  16808 - RT CATARACT W/IOL 1 STA PHACO (Right) (12/08/2015)  Extracapsular cataract removal with insertion of intraocular lens prosthesis (1 stage procedure), manual or mechanical technique (eg, irrigation and aspiration or phacoemulsification) (12/08/2015)  Replacement of Right Lens with Synthetic Substitute, Percutaneous Approach (12/08/2015)  Lumpectomy (1996)   Medications  amlodipine 5 mg oral tablet, See Instructions, 1 refills  aspirin 81 mg oral tablet, Daily  meloxicam 15 mg oral tablet, See Instructions, 1 refills  metformin 500 mg oral tablet, See Instructions, 1 refills  olmesartan 40 mg oral tablet, See Instructions, 1 refills  rosuvastatin 20 mg oral tablet, See Instructions, 1 refills  sildenafil 20 mg oral tablet, See Instructions, 11 refills  Allergies  No Known Medication Allergies  Social History  Abuse/Neglect  No, 08/14/2019  Alcohol - Denies Alcohol Use, 12/02/2015  Past, 05/23/2017  Employment/School - Low Risk, 12/02/2015  Employed, Work/School description: [employer] 12/02/2015  Home/Environment - Low Risk, 12/02/2015  Lives with Spouse., 12/02/2015  Substance Use - Denies Substance Abuse, 12/02/2015  Never, 05/25/2017  Tobacco - Denies Tobacco Use, 12/02/2015  Former smoker, quit more than 30 days ago, No, 08/14/2019  Family History  Hypertension.: Father.  Stroke.: Father.  Health Maintenance  Health Maintenance  ???Pending?(in the next year)  ??? ??OverDue  ??? ? ? ?Aspirin Therapy for CVD Prevention due??05/23/18??and every 1??year(s)  ??? ? ?  ?Alcohol Misuse Screening due??01/01/19??and every 1??year(s)  ??? ? ? ?Diabetes Maintenance-Eye Exam due??07/06/19??and every 1??year(s)  ??? ??Due?  ??? ? ? ?ADL Screening due??08/24/19??and every 1??year(s)  ??? ? ? ?Depression Screening due??08/24/19??and every?  ??? ? ? ?Diabetes Maintenance-Foot Exam due??08/24/19??and every?  ??? ? ? ?Influenza Vaccine due??08/24/19??and every?  ??? ? ? ?Tetanus Vaccine due??08/24/19??and every 10??year(s)  ??? ? ? ?Zoster Vaccine due??08/24/19??and every 100??year(s)  ??? ??Due In Future?  ??? ? ? ?Obesity Screening not due until??01/01/20??and every 1??year(s)  ??? ? ? ?Diabetes Maintenance-Fasting Lipid Profile not due until??05/21/20??and every 1??year(s)  ??? ? ? ?Diabetes Maintenance-HgbA1c not due until??05/21/20??and every 1??year(s)  ??? ? ? ?Hypertension Management-BMP not due until??05/21/20??and every 1??year(s)  ??? ? ? ?Diabetes Maintenance-Microalbumin not due until??05/22/20??and every 1??year(s)  ??? ? ? ?Diabetes Maintenance-Serum Creatinine not due until??05/22/20??and every 1??year(s)  ??? ? ? ?Diabetes Maintenance-Urine Dipstick not due until??05/22/20??and every 1??year(s)  ??? ? ? ?Blood Pressure Screening not due until??08/13/20??and every 1??year(s)  ??? ? ? ?Body Mass Index Check not due until??08/13/20??and every 1??year(s)  ??? ? ? ?Hypertension Management-Blood Pressure not due until??08/13/20??and every 1??year(s)  ???Satisfied?(in the past 1 year)  ??? ??Satisfied?  ??? ? ? ?Blood Pressure Screening on??08/14/19.??Satisfied by Zahra Merritt LPN  ??? ? ? ?Body Mass Index Check on??08/14/19.??Satisfied by Zahra Merritt LPN  ??? ? ? ?Diabetes Maintenance-Fasting Lipid Profile on??05/22/19.??Satisfied by Cj Novoa  ??? ? ? ?Diabetes Maintenance-HgbA1c on??05/22/19.??Satisfied by Cj Novoa  ??? ? ? ?Diabetes Maintenance-Microalbumin on??05/22/19.??Satisfied by Cj Novoa  ??? ? ? ?Diabetes Maintenance-Serum Creatinine  on??05/22/19.??Satisfied by Cj Novoa  ??? ? ? ?Diabetes Maintenance-Urine Dipstick on??05/22/19.??Satisfied by Cj Novoa  ??? ? ? ?Diabetes Screening on??05/22/19.??Satisfied by Cj Novoa  ??? ? ? ?Hypertension Management-Blood Pressure on??08/14/19.??Satisfied by Zahra Merritt LPN  ??? ? ? ?Hypertension Management-BMP on??05/22/19.??Satisfied by Cj Novoa  ??? ? ? ?Lipid Screening on??05/22/19.??Satisfied by Cj Novoa  ??? ? ? ?Obesity Screening on??08/14/19.??Satisfied by Zahra Merritt LPN  ?

## 2022-05-03 NOTE — HISTORICAL [facility]ER
This is a historical note converted from [place]. Formatting and pictures may have been removed.  Please reference [facility] for original formatting and attached multimedia. Chief Complaint  cpx  History of Present Illness  Patient presents for his wellness exam. ?He missed his 6-month follow-up in December.  ?   Took a severance package in March. Doing projects around the house.???, 2 children, 7 grands. Exercise : walks 2 miles per day,? No nicotine since , occasional etoh.  ?   Father  at ?83 : CVA, HTN  Mother 86 : Alzheimers, CAD  2 brothers : healthy  2 sisters :? healthy  ?   GI: [facility], colonoscopy  - polyp, due   OPHTH : [place], had a?retinal exam?this calendar year.  ?  Review of Systems  General:??????????????? Patient reports energy level is good. Denies weight change. ?Denies fever,chills, night sweats, fatigue or weakness.  Integument:???????? Denies any nevus changes, rashes, urticaria, ?or sores.? Also denies itching or areas of numbness.  HEENT:?????????????????? Denies vision changes or eye pain.? No sore throat, ear pain, sinus pressure or discharge.  Cardiovascular:??? Denies chest pain, palpitations, dyspnea on exertion, orthopnea.  Respiratory:???????? No cough, wheezing, shortness of breath, or sputum.  GI:????????????????????????? Denies nausea, emesis, constipation, diarrhea, melena, hematochezia or abdominal pain  :??????????????????????? No frequency, urgency, hematuria, discharge, or incontinence  MS:?????????????????????? Denies myalgias, arthralgias, joint effusion, edema, or weakness  Neuro:????????????????? No headaches, numbness in extremities, tingling, dizziness, or weakness  Psych:?????????????????? Denies anxiety, depression, suicidal or homicidal ideations, or irritability  Endo:??????????????????? Denies polyuria, polydipsia, polyphagia  Heme:????????????????? No abnormal bleeding or bruising. No lymph node enlargement or pain.  Physical Exam  Vitals  & Measurements  HR:?103(Peripheral)? BP:?136/86?  HT:?180?cm? WT:?112.4?kg? BMI:?34.69?  General :?????Well-developed obese male in no apparent distress, alert and oriented ?4  Integument :????? Skin is intact with no erythema.? No pustules or vesicles.? No rash or scale. No Lymphadenopathy.? No urticaria.? No abnormal nevi  HEENT :?????CHAS, EOMI ; TMs and EACs clear, normal turbinates with no erythema, normal mucosa, no sinus tenderness; no erythema or exudate of mouth or pharynx  Neck :?????Supple, no lymphadenopathy, no thyromegaly, no bruits, no jugular venous distention  Cardiovascular :?????? Regular rhythm and rate, no murmurs, radial and dorsal pedal pulses 2+ bilaterally  Respiratory :??????Lungs clear to auscultation bilaterally, no wheezes, no crackles, no rhonchi.? Good air movement  Abdomen :?????? ?NABS, soft, nontender, no hepatosplenomegaly, no masses, no guarding or rebound??????????????????????????  MS :??????? No muscle tenderness, joints WNL, FROM, negative SLR, no?CCE  Neuro :? ?????? CN II-XII intact, reflexes 2+ throughout, no motor sensory deficits, negative?cerebellar? tests  Psych :??????Normal affect, patient calm, good historian, patient answers questions??? appropriately.????Good hygiene? ??  Heme :?????? No bruising or petechiae?  Assessment/Plan  1.?Wellness examination?Z00.00  ?Age-appropriate wellness topics discussed. ?He is up-to-date on his colonoscopy screening.? He is up-to-date on his retinal screening.? Fasting labs will be done today and follow-up to be determined.  Ordered:  CBC w/ Auto Diff, Routine collect, 06/19/20 12:04:00 CDT, Blood, Order for future visit, Stop date 06/19/20 12:04:00 CDT, Lab Collect, Wellness examination  Hypertension, 06/19/20 12:04:00 CDT  Comprehensive Metabolic Panel, Routine collect, 06/19/20 12:04:00 CDT, Blood, Order for future visit, Stop date 06/19/20 12:04:00 CDT, Lab Collect, Wellness examination  Hypercholesterolemia, 06/19/20 12:04:00  CDT  Lab Collection Request, 06/19/20 12:04:00 CDT, HLINK AMB - AFP, 06/19/20 12:04:00 CDT, Wellness examination  Lipid Panel, Routine collect, 06/19/20 12:04:00 CDT, Blood, Order for future visit, Stop date 06/19/20 12:04:00 CDT, Lab Collect, Wellness examination  Hypercholesterolemia, 06/19/20 12:04:00 CDT  Preventative Health Care Est 40-64 years [id number] PC, Wellness examination  DM2 (diabetes mellitus, type 2)  Hypertension  Hypercholesterolemia  Obesity, [facility]LINK AMB - AFP, 06/19/20 12:05:00 CDT  Prostate Specific Antigen, Routine collect, 06/19/20 12:04:00 CDT, Blood, Order for future visit, Stop date 06/19/20 12:04:00 CDT, Lab Collect, Wellness examination, 06/19/20 12:04:00 CDT  Thyroid Stimulating Hormone, Routine collect, 06/19/20 12:04:00 CDT, Blood, Order for future visit, Stop date 06/19/20 12:04:00 CDT, Lab Collect, Wellness examination  Hypertension  Hypercholesterolemia  Obesity, 06/19/20 12:04:00 CDT  Urinalysis no Reflex, Routine collect, Urine, Order for future visit, 06/19/20 12:04:00 CDT, Stop date 06/19/20 12:04:00 CDT, Nurse collect, Wellness examination  ?  2.?DM2 (diabetes mellitus, type 2)?E11.9  ?Refilled metformin 500 mg twice daily for 6 months.  Ordered:  Clinic Follow up, *Est. 12/19/20 3:00:00 CST, Order for future visit, DM2 (diabetes mellitus, type 2)  Hypertension  Hypercholesterolemia  Obesity, [facility]Link AFP  Hemoglobin A1c, Routine collect, *Est. 12/19/20 3:00:00 CST, Blood, Order for future visit, *Est. Stop date 12/19/20 3:00:00 CST, Lab Collect, DM2 (diabetes mellitus, type 2), 06/19/20 12:03:00 CDT  Hemoglobin A1c, Routine collect, 06/19/20 12:04:00 CDT, Blood, Order for future visit, Stop date 06/19/20 12:04:00 CDT, Lab Collect, DM2 (diabetes mellitus, type 2), 06/19/20 12:04:00 CDT  Microalbumin Urine, Routine collect, Urine, Order for future visit, *Est. 12/19/20 3:00:00 CST, *Est. Stop date 12/19/20 3:00:00 CST, Nurse collect, DM2 (diabetes mellitus, type  2)  Microalbumin Urine, Routine collect, Urine, Order for future visit, 06/19/20 12:04:00 CDT, Stop date 06/19/20 12:04:00 CDT, Nurse collect, DM2 (diabetes mellitus, type 2)  Preventative Health Care Est 40-64 years [address]674 PC, Wellness examination  DM2 (diabetes mellitus, type 2)  Hypertension  Hypercholesterolemia  Obesity, INK AMB - AFP, 06/19/20 12:05:00 CDT  Thyroid Stimulating Hormone, Routine collect, *Est. 12/19/20 3:00:00 CST, Blood, Order for future visit, *Est. Stop date 12/19/20 3:00:00 CST, Lab Collect, Hypertension  DM2 (diabetes mellitus, type 2), 06/19/20 12:03:00 CDT  ?  3.?Hypertension?I10  ?Stable. ?Refilled amlodipine 5 mg?home?and 40 mg?for 6 months.  Ordered:  CBC w/ Auto Diff, Routine collect, 06/19/20 12:04:00 CDT, Blood, Order for future visit, Stop date 06/19/20 12:04:00 CDT, Lab Collect, Wellness examination  Hypertension, 06/19/20 12:04:00 CDT  Clinic Follow up, *Est. 12/19/20 3:00:00 CST, Order for future visit, DM2 (diabetes mellitus, type 2)  Hypertension  Hypercholesterolemia  Obesity, [facility] Care Est 40-64 years [id number] PC, Wellness examination  DM2 (diabetes mellitus, type 2)  Hypertension  Hypercholesterolemia  Obesity, INK AMB - AFP, 06/19/20 12:05:00 CDT  Thyroid Stimulating Hormone, Routine collect, *Est. 12/19/20 3:00:00 CST, Blood, Order for future visit, *Est. Stop date 12/19/20 3:00:00 CST, Lab Collect, Hypertension  DM2 (diabetes mellitus, type 2), 06/19/20 12:03:00 CDT  Thyroid Stimulating Hormone, Routine collect, 06/19/20 12:04:00 CDT, Blood, Order for future visit, Stop date 06/19/20 12:04:00 CDT, Lab Collect, Wellness examination  Hypertension  Hypercholesterolemia  Obesity, 06/19/20 12:04:00 CDT  ?  4.?Hypercholesterolemia?E78.00  Refilled rosuvastatin 20 mg for 6 months.  Ordered:  Clinic Follow up, *Est. 12/19/20 3:00:00 CST, Order for future visit, DM2 (diabetes mellitus, type 2)  Hypertension  Hypercholesterolemia   Obesity, [facility]Link AFP  Comprehensive Metabolic Panel, Routine collect, *Est. 12/19/20 3:00:00 CST, Blood, Order for future visit, *Est. Stop date 12/19/20 3:00:00 CST, Lab Collect, Hypercholesterolemia, 06/19/20 12:03:00 CDT  Comprehensive Metabolic Panel, Routine collect, 06/19/20 12:04:00 CDT, Blood, Order for future visit, Stop date 06/19/20 12:04:00 CDT, Lab Collect, Wellness examination  Hypercholesterolemia, 06/19/20 12:04:00 CDT  Lipid Panel, Routine collect, *Est. 12/19/20 3:00:00 CST, Blood, Order for future visit, *Est. Stop date 12/19/20 3:00:00 CST, Lab Collect, Hypercholesterolemia, 06/19/20 12:03:00 CDT  Lipid Panel, Routine collect, 06/19/20 12:04:00 CDT, Blood, Order for future visit, Stop date 06/19/20 12:04:00 CDT, Lab Collect, Wellness examination  Hypercholesterolemia, 06/19/20 12:04:00 CDT  Preventative Health Care Est 40-64 years [phone] PC, Wellness examination  DM2 (diabetes mellitus, type 2)  Hypertension  Hypercholesterolemia  Obesity, HLINK AMB - AFP, 06/19/20 12:05:00 CDT  Thyroid Stimulating Hormone, Routine collect, 06/19/20 12:04:00 CDT, Blood, Order for future visit, Stop date 06/19/20 12:04:00 CDT, Lab Collect, Wellness examination  Hypertension  Hypercholesterolemia  Obesity, 06/19/20 12:04:00 CDT  ?  5.?Obesity?E66.9  ?Long-term adverse consequences of obesity discussed with patient at length.? He strongly encouraged to lose weight.  Ordered:  Clinic Follow up, *Est. 12/19/20 3:00:00 CST, Order for future visit, DM2 (diabetes mellitus, type 2)  Hypertension  Hypercholesterolemia  Obesity, HLink AFP  Preventative Health Care Est 40-64 years 17330 PC, Wellness examination  DM2 (diabetes mellitus, type 2)  Hypertension  Hypercholesterolemia  Obesity, HLINK AMB - AFP, 06/19/20 12:05:00 CDT  Thyroid Stimulating Hormone, Routine collect, 06/19/20 12:04:00 CDT, Blood, Order for future visit, Stop date 06/19/20 12:04:00 CDT, Lab Collect, Wellness examination  Hypertension   Hypercholesterolemia  Obesity, 06/19/20 12:04:00 CDT  ?  6.?Osteoarthritis of AC (acromioclavicular) joint?M19.019  ?Refill meloxicam for 6 months.  ?  Orders:  amLODIPine, See Instructions, TAKE 1 TABLET BY MOUTH DAILY, # 90 tab(s), 1 Refill(s), [id number], 180, cm, Height/Length Dosing, 06/19/20 11:23:00 CDT, 112.4, kg, Weight Dosing, 06/19/20 11:23:00 CDT  meloxicam, See Instructions, TAKE 1 TABLET BY MOUTH DAILY, # 90 tab(s), 1 Refill(s), [id number], 180, cm, Height/Length Dosing, 06/19/20 11:23:00 CDT, 112.4, kg, Weight Dosing, 06/19/20 11:23:00 CDT  metFORMIN, See Instructions, TAKE 1 TABLET BY MOUTH TWO TIMES DAILY, # 180 tab(s), 1 Refill(s), [id number], 180, cm, Height/Length Dosing, 06/19/20 11:23:00 CDT, 112.4, kg, Weight Dosing, 06/19/20 11:23:00 CDT  olmesartan, See Instructions, TAKE 1 TABLET BY MOUTH DAILY, # 90 tab(s), 1 Refill(s), [id number], 180, cm, Height/Length Dosing, 06/19/20 11:23:00 CDT, 112.4, kg, Weight Dosing, 06/19/20 11:23:00 CDT  rosuvastatin, See Instructions, TAKE 1 TABLET BY MOUTH ONCE A DAY AT BEDTIME, # 90 tab(s), 1 Refill(s), [id number], 180, cm, Height/Length Dosing, 06/19/20 11:23:00 CDT, 112.4, kg, Weight Dosing, 06/19/20 11:23:00 CDT  Referrals  Clinic Follow up, *Est. 12/19/20 3:00:00 CST, Order for future visit, DM2 (diabetes mellitus, type 2)  Hypertension  Hypercholesterolemia  Obesity, HLink AFP   Problem List/Past Medical History  Ongoing  DM2 (diabetes mellitus, type 2)  Headache  Hypercholesterolemia  Hypertension  Microcytosis  Obesity  Osteoarthritis of AC (acromioclavicular) joint  Pneumonia  [place]  Wellness examination  Historical  Able to lie down  Activity tolerance  Cataracts  Diabetes  Glasses  Procedure/Surgical History  65189 - LT CATARACT W/IOL 1 STA PHACO (Left) (05/25/2017)  Extracapsular cataract removal with insertion of intraocular lens prosthesis (1 stage  procedure), manual or mechanical technique (eg, irrigation and aspiration or phacoemulsification) (05/25/2017)  Replacement of Left Lens with Synthetic Substitute, Percutaneous Approach (05/25/2017)  Colonoscopy (04/13/2017)  13844 - RT CATARACT W/IOL 1 STA PHACO (Right) (12/08/2015)  Extracapsular cataract removal with insertion of intraocular lens prosthesis (1 stage procedure), manual or mechanical technique (eg, irrigation and aspiration or phacoemulsification) (12/08/2015)  Replacement of Right Lens with Synthetic Substitute, Percutaneous Approach (12/08/2015)  Lumpectomy (1996)   Medications  amlodipine 5 mg oral tablet, See Instructions, 1 refills  aspirin 81 mg oral tablet, Daily  meloxicam 15 mg oral tablet, See Instructions, 1 refills  metformin 500 mg oral tablet, See Instructions, 1 refills  olmesartan 40 mg oral tablet, See Instructions, 1 refills  rosuvastatin 20 mg oral tablet, See Instructions, 1 refills  sildenafil 20 mg oral tablet, See Instructions, 11 refills  Allergies  No Known Medication Allergies  Social History  Abuse/Neglect  No, 06/19/2020  No, 08/14/2019  Alcohol - Denies Alcohol Use, 12/02/2015  Past, 05/23/2017  Employment/School - Low Risk, 12/02/2015  Employed, Work/School description: [employer] ., 12/02/2015  Home/Environment - Low Risk, 12/02/2015  Lives with Spouse., 12/02/2015  Substance Use - Denies Substance Abuse, 12/02/2015  Never, 05/25/2017  Tobacco - Denies Tobacco Use, 12/02/2015  Former smoker, quit more than 30 days ago, No, 06/19/2020  Former smoker, quit more than 30 days ago, No, 08/14/2019  Family History  Hypertension.: Father.  Stroke.: Father.  Health Maintenance  Health Maintenance  ???Pending?(in the next year)  ??? ??OverDue  ??? ? ? ?Diabetes Screening due??and every?  ??? ? ? ?Aspirin Therapy for CVD Prevention due??05/23/18??and every 1??year(s)  ??? ? ? ?Alcohol Misuse Screening due??01/01/20??and every 1??year(s)  ??? ? ? ?Diabetes  Maintenance-Fasting Lipid Profile due??05/21/20??and every 1??year(s)  ??? ? ? ?Diabetes Maintenance-HgbA1c due??05/21/20??and every 1??year(s)  ??? ? ? ?Hypertension Management-BMP due??05/21/20??and every 1??year(s)  ??? ? ? ?Diabetes Maintenance-Serum Creatinine due??05/22/20??and every 1??year(s)  ??? ? ? ?Diabetes Maintenance-Urine Dipstick due??05/22/20??and every 1??year(s)  ??? ??Due?  ??? ? ? ?ADL Screening due??06/19/20??and every 1??year(s)  ??? ? ? ?Depression Screening due??06/19/20??and every?  ??? ? ? ?Diabetes Maintenance-Foot Exam due??06/19/20??and every?  ??? ? ? ?Tetanus Vaccine due??06/19/20??and every 10??year(s)  ??? ? ? ?Zoster Vaccine due??06/19/20??and every 100??year(s)  ??? ??Due In Future?  ??? ? ? ?Obesity Screening not due until??01/01/21??and every 1??year(s)  ??? ? ? ?Diabetes Maintenance-Eye Exam not due until??03/16/21??and every 1??year(s)  ???Satisfied?(in the past 1 year)  ??? ??Satisfied?  ??? ? ? ?Blood Pressure Screening on??06/19/20.??Satisfied by Silke Layton  ??? ? ? ?Body Mass Index Check on??06/19/20.??Satisfied by Silke Layton  ??? ? ? ?Diabetes Maintenance-Eye Exam on??03/16/20.??Satisfied by Stefania Sanchez  ??? ? ? ?Hypertension Management-Blood Pressure on??06/19/20.??Satisfied by Silke Layton  ??? ? ? ?Obesity Screening on??06/19/20.??Satisfied by Silke Layton  ?

## 2022-10-03 ENCOUNTER — PATIENT OUTREACH (OUTPATIENT)
Dept: ADMINISTRATIVE | Facility: HOSPITAL | Age: 67
End: 2022-10-03

## 2022-10-03 NOTE — PROGRESS NOTES
Population Health Outreach.Records Received, hyper-linked into chart at this time. The following record(s)  below were uploaded for Health Maintenance .    4/26/2022-colonoscopy

## 2022-12-19 PROBLEM — E55.9 VITAMIN D DEFICIENCY: Status: ACTIVE | Noted: 2022-12-19

## 2022-12-19 PROBLEM — D64.9 ANEMIA: Status: ACTIVE | Noted: 2022-12-19

## 2022-12-19 PROBLEM — I10 HYPERTENSION: Status: ACTIVE | Noted: 2022-12-19

## 2022-12-19 PROBLEM — E78.00 HYPERCHOLESTEROLEMIA: Status: ACTIVE | Noted: 2022-12-19

## 2022-12-19 PROBLEM — E11.9 TYPE 2 DIABETES MELLITUS: Status: ACTIVE | Noted: 2022-12-19

## 2022-12-19 PROBLEM — E66.9 OBESITY: Status: ACTIVE | Noted: 2022-12-19

## 2022-12-19 PROBLEM — J18.9 PNEUMONIA: Status: RESOLVED | Noted: 2022-12-19 | Resolved: 2022-12-19

## 2022-12-19 PROBLEM — R73.03 PREDIABETES: Status: ACTIVE | Noted: 2022-12-19

## 2022-12-19 PROBLEM — B07.9 VERRUCA: Status: ACTIVE | Noted: 2022-12-19

## 2022-12-19 PROBLEM — R35.1 NOCTURIA: Status: ACTIVE | Noted: 2022-12-19

## 2022-12-19 PROBLEM — M19.019 OSTEOARTHRITIS OF ACROMIOCLAVICULAR JOINT: Status: ACTIVE | Noted: 2022-12-19

## 2022-12-19 PROBLEM — E66.812 CLASS 2 OBESITY DUE TO EXCESS CALORIES WITHOUT SERIOUS COMORBIDITY WITH BODY MASS INDEX (BMI) OF 35.0 TO 35.9 IN ADULT: Status: ACTIVE | Noted: 2022-12-19

## 2022-12-19 PROBLEM — E66.09 CLASS 2 OBESITY DUE TO EXCESS CALORIES WITHOUT SERIOUS COMORBIDITY WITH BODY MASS INDEX (BMI) OF 35.0 TO 35.9 IN ADULT: Status: ACTIVE | Noted: 2022-12-19

## 2022-12-19 PROBLEM — R71.8 MICROCYTIC RED BLOOD CELLS: Status: ACTIVE | Noted: 2022-12-19

## 2022-12-19 PROBLEM — B07.9 VERRUCA: Status: RESOLVED | Noted: 2022-12-19 | Resolved: 2022-12-19

## 2022-12-19 PROBLEM — J18.9 PNEUMONIA: Status: ACTIVE | Noted: 2022-12-19

## 2025-01-01 ENCOUNTER — HOSPITAL ENCOUNTER (INPATIENT)
Facility: HOSPITAL | Age: 70
LOS: 4 days | DRG: 283 | End: 2025-04-02
Attending: EMERGENCY MEDICINE | Admitting: STUDENT IN AN ORGANIZED HEALTH CARE EDUCATION/TRAINING PROGRAM
Payer: MEDICARE

## 2025-01-01 VITALS
BODY MASS INDEX: 39.14 KG/M2 | SYSTOLIC BLOOD PRESSURE: 137 MMHG | HEIGHT: 70 IN | HEART RATE: 94 BPM | TEMPERATURE: 98 F | DIASTOLIC BLOOD PRESSURE: 68 MMHG | OXYGEN SATURATION: 96 % | RESPIRATION RATE: 24 BRPM | WEIGHT: 273.38 LBS

## 2025-01-01 DIAGNOSIS — R00.0 TACHYARRHYTHMIA: ICD-10-CM

## 2025-01-01 DIAGNOSIS — I25.10 CAD (CORONARY ARTERY DISEASE): ICD-10-CM

## 2025-01-01 DIAGNOSIS — I21.3 ST ELEVATION MYOCARDIAL INFARCTION (STEMI), UNSPECIFIED ARTERY: Primary | ICD-10-CM

## 2025-01-01 DIAGNOSIS — I46.9 CARDIAC ARREST: ICD-10-CM

## 2025-01-01 DIAGNOSIS — R07.9 CHEST PAIN: ICD-10-CM

## 2025-01-01 LAB
ABO + RH BLD: NORMAL
ABORH RETYPE: NORMAL
ABS NEUT (OLG): 36.08 X10(3)/MCL (ref 2.1–9.2)
ACCEPTIBLE SP GR UR QL: >1.05 (ref 1–1.03)
ALBUMIN SERPL-MCNC: 2.3 G/DL (ref 3.4–4.8)
ALBUMIN SERPL-MCNC: 2.3 G/DL (ref 3.4–4.8)
ALBUMIN SERPL-MCNC: 2.4 G/DL (ref 3.4–4.8)
ALBUMIN SERPL-MCNC: 2.6 G/DL (ref 3.5–5)
ALBUMIN/GLOB SERPL: 0.8 RATIO (ref 1.1–2)
ALBUMIN/GLOB SERPL: 0.9 RATIO (ref 1.1–2)
ALLENS TEST BLOOD GAS (OHS): ABNORMAL
ALP SERPL-CCNC: 108 UNIT/L (ref 40–150)
ALP SERPL-CCNC: 140 UNIT/L (ref 40–150)
ALP SERPL-CCNC: 83 UNIT/L (ref 40–150)
ALP SERPL-CCNC: 99 UNIT/L (ref 40–150)
ALT SERPL-CCNC: 222 UNIT/L (ref 0–55)
ALT SERPL-CCNC: 249 UNIT/L (ref 0–55)
ALT SERPL-CCNC: 324 UNIT/L (ref 0–55)
ALT SERPL-CCNC: 73 UNIT/L (ref 0–55)
AMPHET UR QL SCN: NEGATIVE
ANION GAP SERPL CALC-SCNC: 14 MEQ/L
ANION GAP SERPL CALC-SCNC: 23 MEQ/L
ANION GAP SERPL CALC-SCNC: 24 MEQ/L
ANION GAP SERPL CALC-SCNC: 24 MEQ/L
ANION GAP SERPL CALC-SCNC: 6 MEQ/L
ANION GAP SERPL CALC-SCNC: 7 MEQ/L
ANION GAP SERPL CALC-SCNC: 9 MEQ/L
ANISOCYTOSIS BLD QL SMEAR: ABNORMAL
ANISOCYTOSIS BLD QL SMEAR: ABNORMAL
APTT PPP: 70.6 SECONDS (ref 23.2–33.7)
AST SERPL-CCNC: 250 UNIT/L (ref 11–45)
AST SERPL-CCNC: 442 UNIT/L (ref 11–45)
AST SERPL-CCNC: 792 UNIT/L (ref 11–45)
AST SERPL-CCNC: 823 UNIT/L (ref 11–45)
AV INDEX (PROSTH): 0.83
AV MEAN GRADIENT: 4 MMHG
AV PEAK GRADIENT: 8 MMHG
AV VELOCITY RATIO: 0.86
BACTERIA #/AREA URNS AUTO: ABNORMAL /HPF
BACTERIA UR CULT: NO GROWTH
BARBITURATE SCN PRESENT UR: NEGATIVE
BASE EXCESS BLD CALC-SCNC: -12.7 MMOL/L
BASE EXCESS BLD CALC-SCNC: -15.4 MMOL/L
BASE EXCESS BLD CALC-SCNC: -15.7 MMOL/L (ref -2–2)
BASE EXCESS BLD CALC-SCNC: -18.3 MMOL/L
BASE EXCESS BLD CALC-SCNC: 11.6 MMOL/L (ref -2–2)
BASE EXCESS BLD CALC-SCNC: 11.9 MMOL/L (ref -2–2)
BASE EXCESS BLD CALC-SCNC: 12.6 MMOL/L
BASE EXCESS BLD CALC-SCNC: 13.2 MMOL/L
BASE EXCESS BLD CALC-SCNC: 15.8 MMOL/L (ref -2–2)
BASE EXCESS BLD CALC-SCNC: 9.3 MMOL/L
BASOPHILS # BLD AUTO: 0.02 X10(3)/MCL
BASOPHILS # BLD AUTO: 0.03 X10(3)/MCL
BASOPHILS # BLD AUTO: 0.04 X10(3)/MCL
BASOPHILS # BLD AUTO: 0.05 X10(3)/MCL
BASOPHILS NFR BLD AUTO: 0.1 %
BASOPHILS NFR BLD AUTO: 0.1 %
BASOPHILS NFR BLD AUTO: 0.2 %
BASOPHILS NFR BLD AUTO: 0.3 %
BENZODIAZ UR QL SCN: POSITIVE
BILIRUB SERPL-MCNC: 0.6 MG/DL
BILIRUB SERPL-MCNC: 1.5 MG/DL
BILIRUB SERPL-MCNC: 1.8 MG/DL
BILIRUB SERPL-MCNC: 2.2 MG/DL
BILIRUB UR QL STRIP.AUTO: NEGATIVE
BLD PROD TYP BPU: NORMAL
BLOOD GAS SAMPLE TYPE (OHS): ABNORMAL
BLOOD UNIT EXPIRATION DATE: NORMAL
BLOOD UNIT TYPE CODE: 5100
BSA FOR ECHO PROCEDURE: 2.22 M2
BUN SERPL-MCNC: 13.6 MG/DL (ref 8.4–25.7)
BUN SERPL-MCNC: 15.2 MG/DL (ref 8.4–25.7)
BUN SERPL-MCNC: 16.4 MG/DL (ref 8.4–25.7)
BUN SERPL-MCNC: 21.9 MG/DL (ref 8.4–25.7)
BUN SERPL-MCNC: 37.2 MG/DL (ref 8.4–25.7)
BUN SERPL-MCNC: 6.8 MG/DL (ref 8.4–25.7)
BUN SERPL-MCNC: 9.8 MG/DL (ref 8.4–25.7)
BURR CELLS (OLG): ABNORMAL
BURR CELLS (OLG): ABNORMAL
CA-I BLD-SCNC: 0.97 MMOL/L (ref 1.12–1.23)
CA-I BLD-SCNC: 0.99 MMOL/L (ref 1.12–1.23)
CA-I BLD-SCNC: 1.09 MMOL/L (ref 1.12–1.23)
CA-I BLD-SCNC: 1.13 MMOL/L (ref 1.12–1.23)
CA-I BLD-SCNC: 1.14 MMOL/L (ref 1.12–1.23)
CA-I BLD-SCNC: 1.17 MMOL/L (ref 1.12–1.23)
CA-I BLD-SCNC: 1.18 MMOL/L (ref 1.12–1.23)
CA-I BLD-SCNC: 1.2 MMOL/L (ref 1.12–1.23)
CA-I BLD-SCNC: 1.23 MMOL/L (ref 1.12–1.23)
CA-I BLD-SCNC: 1.24 MMOL/L (ref 1.12–1.23)
CALCIUM SERPL-MCNC: 7.3 MG/DL (ref 8.8–10)
CALCIUM SERPL-MCNC: 7.4 MG/DL (ref 8.8–10)
CALCIUM SERPL-MCNC: 7.6 MG/DL (ref 8.8–10)
CALCIUM SERPL-MCNC: 7.9 MG/DL (ref 8.8–10)
CALCIUM SERPL-MCNC: 7.9 MG/DL (ref 8.8–10)
CALCIUM SERPL-MCNC: 8.7 MG/DL (ref 8.4–10.2)
CALCIUM SERPL-MCNC: 9.1 MG/DL (ref 8.4–10.2)
CANNABINOIDS UR QL SCN: NEGATIVE
CHLORIDE SERPL-SCNC: 100 MMOL/L (ref 98–107)
CHLORIDE SERPL-SCNC: 100 MMOL/L (ref 98–107)
CHLORIDE SERPL-SCNC: 102 MMOL/L (ref 98–107)
CHLORIDE SERPL-SCNC: 102 MMOL/L (ref 98–107)
CHLORIDE SERPL-SCNC: 96 MMOL/L (ref 98–107)
CHLORIDE SERPL-SCNC: 97 MMOL/L (ref 98–107)
CHLORIDE SERPL-SCNC: 99 MMOL/L (ref 98–107)
CK SERPL-CCNC: 2137 U/L (ref 30–200)
CLARITY UR: ABNORMAL
CO2 BLDA-SCNC: 11.6 MMOL/L
CO2 BLDA-SCNC: 12.2 MMOL/L
CO2 BLDA-SCNC: 14.1 MMOL/L
CO2 BLDA-SCNC: 14.4 MMOL/L
CO2 BLDA-SCNC: 37.3 MMOL/L
CO2 BLDA-SCNC: 38 MMOL/L
CO2 BLDA-SCNC: 38.2 MMOL/L
CO2 BLDA-SCNC: 38.4 MMOL/L
CO2 BLDA-SCNC: 40.4 MMOL/L
CO2 BLDA-SCNC: 41.6 MMOL/L
CO2 SERPL-SCNC: 10 MMOL/L (ref 22–29)
CO2 SERPL-SCNC: 10 MMOL/L (ref 22–29)
CO2 SERPL-SCNC: 17 MMOL/L (ref 23–31)
CO2 SERPL-SCNC: 25 MMOL/L (ref 23–31)
CO2 SERPL-SCNC: 30 MMOL/L (ref 23–31)
CO2 SERPL-SCNC: 31 MMOL/L (ref 23–31)
CO2 SERPL-SCNC: 32 MMOL/L (ref 23–31)
COCAINE UR QL SCN: NEGATIVE
COHGB MFR BLDA: 1 % (ref 0.5–1.5)
COHGB MFR BLDA: 2 % (ref 0.5–1.5)
COHGB MFR BLDA: 2.3 % (ref 0.5–1.5)
COHGB MFR BLDA: 2.4 % (ref 0.5–1.5)
COLOR UR AUTO: ABNORMAL
CREAT SERPL-MCNC: 1.15 MG/DL (ref 0.72–1.25)
CREAT SERPL-MCNC: 1.26 MG/DL (ref 0.72–1.25)
CREAT SERPL-MCNC: 1.32 MG/DL (ref 0.72–1.25)
CREAT SERPL-MCNC: 1.37 MG/DL (ref 0.72–1.25)
CREAT SERPL-MCNC: 1.39 MG/DL (ref 0.72–1.25)
CREAT SERPL-MCNC: 1.45 MG/DL (ref 0.72–1.25)
CREAT SERPL-MCNC: 1.57 MG/DL (ref 0.72–1.25)
CREAT/UREA NIT SERPL: 10
CREAT/UREA NIT SERPL: 12
CREAT/UREA NIT SERPL: 17
CREAT/UREA NIT SERPL: 30
CREAT/UREA NIT SERPL: 6
CREAT/UREA NIT SERPL: 7
CREAT/UREA NIT SERPL: 9
CROSSMATCH INTERPRETATION: NORMAL
DISPENSE STATUS: NORMAL
DOP CALC AO PEAK VEL: 1.4 M/S
DOP CALC AO VTI: 21.5 CM
DOP CALC LVOT PEAK VEL: 1.2 M/S
DOP CALC MV VTI: 20 CM
DOP CALCLVOT PEAK VEL VTI: 17.8 CM
DRAWN BY BLOOD GAS (OHS): ABNORMAL
E WAVE DECELERATION TIME: 217 MSEC
E/A RATIO: 0.8
E/E' RATIO: 8 M/S
EOSINOPHIL # BLD AUTO: 0 X10(3)/MCL (ref 0–0.9)
EOSINOPHIL # BLD AUTO: 0 X10(3)/MCL (ref 0–0.9)
EOSINOPHIL # BLD AUTO: 0.01 X10(3)/MCL (ref 0–0.9)
EOSINOPHIL # BLD AUTO: 0.06 X10(3)/MCL (ref 0–0.9)
EOSINOPHIL NFR BLD AUTO: 0 %
EOSINOPHIL NFR BLD AUTO: 0.5 %
EPI CELLS #/AREA URNS HPF: ABNORMAL /HPF
ERYTHROCYTE [DISTWIDTH] IN BLOOD BY AUTOMATED COUNT: 17.7 % (ref 11.5–17)
ERYTHROCYTE [DISTWIDTH] IN BLOOD BY AUTOMATED COUNT: 19.6 % (ref 11.5–17)
ERYTHROCYTE [DISTWIDTH] IN BLOOD BY AUTOMATED COUNT: 20.9 % (ref 11.5–17)
ERYTHROCYTE [DISTWIDTH] IN BLOOD BY AUTOMATED COUNT: 21.2 % (ref 11.5–17)
ERYTHROCYTE [DISTWIDTH] IN BLOOD BY AUTOMATED COUNT: 22.4 % (ref 11.5–17)
ERYTHROCYTE [DISTWIDTH] IN BLOOD BY AUTOMATED COUNT: 22.4 % (ref 11.5–17)
ETHANOL SERPL-MCNC: <10 MG/DL
FENTANYL UR QL SCN: POSITIVE
GFR SERPLBLD CREATININE-BSD FMLA CKD-EPI: 47 ML/MIN/1.73/M2
GFR SERPLBLD CREATININE-BSD FMLA CKD-EPI: 52 ML/MIN/1.73/M2
GFR SERPLBLD CREATININE-BSD FMLA CKD-EPI: 56 ML/MIN/1.73/M2
GFR SERPLBLD CREATININE-BSD FMLA CKD-EPI: 58 ML/MIN/1.73/M2
GFR SERPLBLD CREATININE-BSD FMLA CKD-EPI: >60 ML/MIN/1.73/M2
GLOBULIN SER-MCNC: 2.6 GM/DL (ref 2.4–3.5)
GLOBULIN SER-MCNC: 2.9 GM/DL (ref 2.4–3.5)
GLOBULIN SER-MCNC: 3 GM/DL (ref 2.4–3.5)
GLOBULIN SER-MCNC: 3.3 GM/DL (ref 2.4–3.5)
GLUCOSE SERPL-MCNC: 167 MG/DL (ref 82–115)
GLUCOSE SERPL-MCNC: 170 MG/DL (ref 82–115)
GLUCOSE SERPL-MCNC: 199 MG/DL (ref 82–115)
GLUCOSE SERPL-MCNC: 211 MG/DL (ref 82–115)
GLUCOSE SERPL-MCNC: 213 MG/DL (ref 74–100)
GLUCOSE SERPL-MCNC: 229 MG/DL (ref 82–115)
GLUCOSE SERPL-MCNC: 242 MG/DL (ref 74–100)
GLUCOSE SERPL-MCNC: 247 MG/DL (ref 82–115)
GLUCOSE SERPL-MCNC: 255 MG/DL (ref 74–100)
GLUCOSE SERPL-MCNC: 262 MG/DL (ref 74–100)
GLUCOSE UR QL STRIP: ABNORMAL
GROUP & RH: NORMAL
HCO3 BLDA-SCNC: 10.8 MMOL/L (ref 22–26)
HCO3 BLDA-SCNC: 11.3 MMOL/L (ref 22–26)
HCO3 BLDA-SCNC: 12.8 MMOL/L (ref 22–26)
HCO3 BLDA-SCNC: 13.2 MMOL/L (ref 22–26)
HCO3 BLDA-SCNC: 35.9 MMOL/L (ref 22–26)
HCO3 BLDA-SCNC: 36.7 MMOL/L (ref 22–26)
HCO3 BLDA-SCNC: 36.8 MMOL/L (ref 22–26)
HCO3 BLDA-SCNC: 36.8 MMOL/L (ref 22–26)
HCO3 BLDA-SCNC: 38.1 MMOL/L (ref 22–26)
HCO3 BLDA-SCNC: 40.1 MMOL/L (ref 22–26)
HCT VFR BLD AUTO: 19.2 % (ref 42–52)
HCT VFR BLD AUTO: 21.2 % (ref 42–52)
HCT VFR BLD AUTO: 21.7 % (ref 42–52)
HCT VFR BLD AUTO: 22.1 % (ref 42–52)
HCT VFR BLD AUTO: 23.9 % (ref 42–52)
HCT VFR BLD AUTO: 24.3 % (ref 42–52)
HCT VFR BLD AUTO: 25.1 % (ref 42–52)
HCT VFR BLD AUTO: 27.6 % (ref 42–52)
HCT VFR BLD AUTO: 27.8 % (ref 42–52)
HCT VFR BLD AUTO: 27.8 % (ref 42–52)
HCT VFR BLD AUTO: 29.4 % (ref 42–52)
HGB BLD-MCNC: 6.5 G/DL (ref 14–18)
HGB BLD-MCNC: 7.3 G/DL (ref 14–18)
HGB BLD-MCNC: 7.3 G/DL (ref 14–18)
HGB BLD-MCNC: 7.8 G/DL (ref 14–18)
HGB BLD-MCNC: 8.4 G/DL (ref 14–18)
HGB BLD-MCNC: 8.7 G/DL (ref 14–18)
HGB BLD-MCNC: 8.8 G/DL (ref 14–18)
HGB BLD-MCNC: 9.7 G/DL (ref 14–18)
HGB BLD-MCNC: 9.9 G/DL (ref 14–18)
HGB UR QL STRIP: ABNORMAL
HYPOCHROMIA BLD QL SMEAR: ABNORMAL
IMM GRANULOCYTES # BLD AUTO: 0.27 X10(3)/MCL (ref 0–0.04)
IMM GRANULOCYTES # BLD AUTO: 0.38 X10(3)/MCL (ref 0–0.04)
IMM GRANULOCYTES # BLD AUTO: 0.55 X10(3)/MCL (ref 0–0.04)
IMM GRANULOCYTES # BLD AUTO: 1.27 X10(3)/MCL (ref 0–0.04)
IMM GRANULOCYTES NFR BLD AUTO: 1.4 %
IMM GRANULOCYTES NFR BLD AUTO: 1.5 %
IMM GRANULOCYTES NFR BLD AUTO: 4.2 %
IMM GRANULOCYTES NFR BLD AUTO: 4.7 %
INDIRECT COOMBS: NORMAL
INHALED O2 CONCENTRATION: 100 %
INHALED O2 CONCENTRATION: 30 %
INHALED O2 CONCENTRATION: 40 %
INR PPP: 1.9
INR PPP: 2.3
INR PPP: 2.5
INSTRUMENT WBC (OLG): 38.38 X10(3)/MCL
KETONES UR QL STRIP: NEGATIVE
LACTATE SERPL-SCNC: 1.7 MMOL/L (ref 0.5–2.2)
LACTATE SERPL-SCNC: 13.3 MMOL/L (ref 0.5–2.2)
LACTATE SERPL-SCNC: 16.1 MMOL/L (ref 0.5–2.2)
LACTATE SERPL-SCNC: 16.8 MMOL/L (ref 0.5–2.2)
LACTATE SERPL-SCNC: 2.5 MMOL/L (ref 0.5–2.2)
LDH SERPL L TO P-CCNC: 13.56 MMOL/L (ref 0.36–1.25)
LEUKOCYTE ESTERASE UR QL STRIP: NEGATIVE
LIPASE SERPL-CCNC: 72 U/L
LPM (OHS): 8
LV LATERAL E/E' RATIO: 7.6 M/S
LV SEPTAL E/E' RATIO: 7.6 M/S
LVOT MG: 3 MMHG
LVOT MV: 0.75 CM/S
LYMPHOCYTES # BLD AUTO: 0.84 X10(3)/MCL (ref 0.6–4.6)
LYMPHOCYTES # BLD AUTO: 1.03 X10(3)/MCL (ref 0.6–4.6)
LYMPHOCYTES # BLD AUTO: 1.15 X10(3)/MCL (ref 0.6–4.6)
LYMPHOCYTES # BLD AUTO: 1.56 X10(3)/MCL (ref 0.6–4.6)
LYMPHOCYTES NFR BLD AUTO: 11.9 %
LYMPHOCYTES NFR BLD AUTO: 3.1 %
LYMPHOCYTES NFR BLD AUTO: 4.3 %
LYMPHOCYTES NFR BLD AUTO: 5.6 %
LYMPHOCYTES NFR BLD MANUAL: 1.54 X10(3)/MCL (ref 0.6–4.6)
LYMPHOCYTES NFR BLD MANUAL: 4 %
MAGNESIUM SERPL-MCNC: 1.4 MG/DL (ref 1.6–2.6)
MAGNESIUM SERPL-MCNC: 1.7 MG/DL (ref 1.6–2.6)
MAGNESIUM SERPL-MCNC: 1.8 MG/DL (ref 1.6–2.6)
MAGNESIUM SERPL-MCNC: 1.9 MG/DL (ref 1.6–2.6)
MAGNESIUM SERPL-MCNC: 2.3 MG/DL (ref 1.6–2.6)
MCH RBC QN AUTO: 22.6 PG (ref 27–31)
MCH RBC QN AUTO: 24.1 PG (ref 27–31)
MCH RBC QN AUTO: 24.7 PG (ref 27–31)
MCH RBC QN AUTO: 25.2 PG (ref 27–31)
MCH RBC QN AUTO: 25.2 PG (ref 27–31)
MCH RBC QN AUTO: 25.5 PG (ref 27–31)
MCHC RBC AUTO-ENTMCNC: 33.5 G/DL (ref 33–36)
MCHC RBC AUTO-ENTMCNC: 33.6 G/DL (ref 33–36)
MCHC RBC AUTO-ENTMCNC: 33.9 G/DL (ref 33–36)
MCHC RBC AUTO-ENTMCNC: 34.4 G/DL (ref 33–36)
MCHC RBC AUTO-ENTMCNC: 35.3 G/DL (ref 33–36)
MCHC RBC AUTO-ENTMCNC: 36.2 G/DL (ref 33–36)
MCV RBC AUTO: 66.9 FL (ref 80–94)
MCV RBC AUTO: 69.6 FL (ref 80–94)
MCV RBC AUTO: 71.3 FL (ref 80–94)
MCV RBC AUTO: 71.9 FL (ref 80–94)
MCV RBC AUTO: 73.6 FL (ref 80–94)
MCV RBC AUTO: 74.1 FL (ref 80–94)
MDMA UR QL SCN: NEGATIVE
MECH RR (OHS): 16 B/MIN
MECH RR (OHS): 18 B/MIN
MECH RR (OHS): 20 B/MIN
MECH RR (OHS): 24 B/MIN
MECH RR (OHS): 26 B/MIN
METHGB MFR BLDA: 0.8 % (ref 0.4–1.5)
METHGB MFR BLDA: 1 % (ref 0.4–1.5)
METHGB MFR BLDA: 1 % (ref 0.4–1.5)
METHGB MFR BLDA: 1.3 % (ref 0.4–1.5)
MICROCYTES BLD QL SMEAR: ABNORMAL
MICROCYTES BLD QL SMEAR: ABNORMAL
MODE (OHS): AC
MONOCYTES # BLD AUTO: 0.37 X10(3)/MCL (ref 0.1–1.3)
MONOCYTES # BLD AUTO: 0.76 X10(3)/MCL (ref 0.1–1.3)
MONOCYTES # BLD AUTO: 1.74 X10(3)/MCL (ref 0.1–1.3)
MONOCYTES # BLD AUTO: 2.24 X10(3)/MCL (ref 0.1–1.3)
MONOCYTES NFR BLD AUTO: 2.8 %
MONOCYTES NFR BLD AUTO: 4.1 %
MONOCYTES NFR BLD AUTO: 6.5 %
MONOCYTES NFR BLD AUTO: 8.2 %
MONOCYTES NFR BLD MANUAL: 0.77 X10(3)/MCL (ref 0.1–1.3)
MONOCYTES NFR BLD MANUAL: 2 %
MUCOUS THREADS URNS QL MICRO: ABNORMAL /LPF
MV MEAN GRADIENT: 2 MMHG
MV PEAK A VEL: 0.76 M/S
MV PEAK E VEL: 0.61 M/S
MV PEAK GRADIENT: 3 MMHG
NEUTROPHILS # BLD AUTO: 10.53 X10(3)/MCL (ref 2.1–9.2)
NEUTROPHILS # BLD AUTO: 16.45 X10(3)/MCL (ref 2.1–9.2)
NEUTROPHILS # BLD AUTO: 22.86 X10(3)/MCL (ref 2.1–9.2)
NEUTROPHILS # BLD AUTO: 23.52 X10(3)/MCL (ref 2.1–9.2)
NEUTROPHILS NFR BLD AUTO: 80.3 %
NEUTROPHILS NFR BLD AUTO: 83.8 %
NEUTROPHILS NFR BLD AUTO: 87.7 %
NEUTROPHILS NFR BLD AUTO: 88.7 %
NEUTROPHILS NFR BLD MANUAL: 94 %
NITRITE UR QL STRIP: NEGATIVE
NRBC BLD AUTO-RTO: 0 %
NRBC BLD AUTO-RTO: 0.1 %
NRBC BLD AUTO-RTO: 0.1 %
NRBC BLD AUTO-RTO: 0.2 %
NRBC BLD AUTO-RTO: 0.2 %
NSE SERPL-MCNC: 103 NG/ML
O2 HB BLOOD GAS (OHS): 95.7 % (ref 94–97)
O2 HB BLOOD GAS (OHS): 96 % (ref 94–97)
O2 HB BLOOD GAS (OHS): 96.4 % (ref 94–97)
O2 HB BLOOD GAS (OHS): 98.1 % (ref 94–97)
OHS QRS DURATION: 108 MS
OHS QRS DURATION: 112 MS
OHS QRS DURATION: 98 MS
OHS QTC CALCULATION: 413 MS
OHS QTC CALCULATION: 493 MS
OHS QTC CALCULATION: 561 MS
OPIATES UR QL SCN: NEGATIVE
OXYGEN DEVICE BLOOD GAS (OHS): ABNORMAL
OXYHGB MFR BLDA: 6.9 G/DL (ref 12–16)
OXYHGB MFR BLDA: 7.5 G/DL (ref 12–16)
OXYHGB MFR BLDA: 7.6 G/DL (ref 12–16)
OXYHGB MFR BLDA: 8.4 G/DL (ref 12–16)
PCO2 BLDA: 27 MMHG (ref 35–45)
PCO2 BLDA: 29 MMHG (ref 35–45)
PCO2 BLDA: 30 MMHG (ref 35–45)
PCO2 BLDA: 41 MMHG (ref 35–45)
PCO2 BLDA: 44 MMHG (ref 35–45)
PCO2 BLDA: 44 MMHG (ref 35–45)
PCO2 BLDA: 48 MMHG (ref 35–45)
PCO2 BLDA: 52 MMHG (ref 35–45)
PCO2 BLDA: 53 MMHG (ref 35–45)
PCO2 BLDA: 74 MMHG (ref 35–45)
PCP UR QL: NEGATIVE
PEEP RESPIRATORY: 5 CMH2O
PH BLDA: 7 [PH] (ref 7.35–7.45)
PH BLDA: 7.2 [PH] (ref 7.35–7.45)
PH BLDA: 7.21 [PH] (ref 7.35–7.45)
PH BLDA: 7.25 [PH] (ref 7.35–7.45)
PH BLDA: 7.32 [PH] (ref 7.35–7.45)
PH BLDA: 7.45 [PH] (ref 7.35–7.45)
PH BLDA: 7.52 [PH] (ref 7.35–7.45)
PH BLDA: 7.53 [PH] (ref 7.35–7.45)
PH BLDA: 7.53 [PH] (ref 7.35–7.45)
PH BLDA: 7.56 [PH] (ref 7.35–7.45)
PH UR STRIP: 5.5 [PH]
PH UR: 5.5 [PH] (ref 3–11)
PHOSPHATE SERPL-MCNC: 2.2 MG/DL (ref 2.3–4.7)
PHOSPHATE SERPL-MCNC: 3.7 MG/DL (ref 2.3–4.7)
PHOSPHATE SERPL-MCNC: 4.5 MG/DL (ref 2.3–4.7)
PHOSPHATE SERPL-MCNC: 5.3 MG/DL (ref 2.3–4.7)
PHOSPHATE SERPL-MCNC: 6 MG/DL (ref 2.3–4.7)
PHOSPHATE SERPL-MCNC: 8.3 MG/DL (ref 2.3–4.7)
PLATELET # BLD AUTO: 152 X10(3)/MCL (ref 130–400)
PLATELET # BLD AUTO: 177 X10(3)/MCL (ref 130–400)
PLATELET # BLD AUTO: 189 X10(3)/MCL (ref 130–400)
PLATELET # BLD AUTO: 211 X10(3)/MCL (ref 130–400)
PLATELET # BLD AUTO: 211 X10(3)/MCL (ref 130–400)
PLATELET # BLD AUTO: 282 X10(3)/MCL (ref 130–400)
PLATELET # BLD EST: NORMAL 10*3/UL
PLATELET # BLD EST: NORMAL 10*3/UL
PLATELETS.RETICULATED NFR BLD AUTO: 10.8 % (ref 0.9–11.2)
PLATELETS.RETICULATED NFR BLD AUTO: 10.9 % (ref 0.9–11.2)
PLATELETS.RETICULATED NFR BLD AUTO: 6 % (ref 0.9–11.2)
PLATELETS.RETICULATED NFR BLD AUTO: 6.5 % (ref 0.9–11.2)
PLATELETS.RETICULATED NFR BLD AUTO: 9 % (ref 0.9–11.2)
PLATELETS.RETICULATED NFR BLD AUTO: 9.2 % (ref 0.9–11.2)
PMV BLD AUTO: 10.5 FL (ref 7.4–10.4)
PMV BLD AUTO: 11.6 FL (ref 7.4–10.4)
PMV BLD AUTO: 11.7 FL (ref 7.4–10.4)
PMV BLD AUTO: 11.9 FL (ref 7.4–10.4)
PMV BLD AUTO: 12.2 FL (ref 7.4–10.4)
PMV BLD AUTO: 12.3 FL (ref 7.4–10.4)
PO2 BLDA: 166 MMHG (ref 80–100)
PO2 BLDA: 188 MMHG (ref 80–100)
PO2 BLDA: 249 MMHG (ref 80–100)
PO2 BLDA: 466 MMHG (ref 80–100)
PO2 BLDA: 65 MMHG (ref 80–100)
PO2 BLDA: 75 MMHG (ref 80–100)
PO2 BLDA: 76 MMHG (ref 80–100)
PO2 BLDA: 85 MMHG (ref 80–100)
PO2 BLDA: 87 MMHG (ref 80–100)
PO2 BLDA: 88 MMHG (ref 80–100)
POC ACTIVATED CLOTTING TIME K: 164 SEC (ref 74–137)
POCT GLUCOSE: 162 MG/DL (ref 70–110)
POCT GLUCOSE: 199 MG/DL (ref 70–110)
POCT GLUCOSE: 208 MG/DL (ref 70–110)
POCT GLUCOSE: 210 MG/DL (ref 70–110)
POCT GLUCOSE: 211 MG/DL (ref 70–110)
POCT GLUCOSE: 244 MG/DL (ref 70–110)
POIKILOCYTOSIS BLD QL SMEAR: ABNORMAL
POIKILOCYTOSIS BLD QL SMEAR: ABNORMAL
POTASSIUM BLOOD GAS (OHS): 3.3 MMOL/L (ref 3.5–5)
POTASSIUM BLOOD GAS (OHS): 3.3 MMOL/L (ref 3.5–5)
POTASSIUM BLOOD GAS (OHS): 3.4 MMOL/L (ref 3.5–5)
POTASSIUM BLOOD GAS (OHS): 3.4 MMOL/L (ref 3.5–5)
POTASSIUM BLOOD GAS (OHS): 3.5 MMOL/L (ref 3.5–5)
POTASSIUM BLOOD GAS (OHS): 3.5 MMOL/L (ref 3.5–5)
POTASSIUM BLOOD GAS (OHS): 3.6 MMOL/L (ref 3.5–5)
POTASSIUM BLOOD GAS (OHS): 3.6 MMOL/L (ref 3.5–5)
POTASSIUM BLOOD GAS (OHS): 3.7 MMOL/L (ref 3.5–5)
POTASSIUM BLOOD GAS (OHS): 4.8 MMOL/L (ref 3.5–5)
POTASSIUM SERPL-SCNC: 3.5 MMOL/L (ref 3.5–5.1)
POTASSIUM SERPL-SCNC: 3.5 MMOL/L (ref 3.5–5.1)
POTASSIUM SERPL-SCNC: 3.7 MMOL/L (ref 3.5–5.1)
POTASSIUM SERPL-SCNC: 3.7 MMOL/L (ref 3.5–5.1)
POTASSIUM SERPL-SCNC: 3.9 MMOL/L (ref 3.5–5.1)
POTASSIUM SERPL-SCNC: 3.9 MMOL/L (ref 3.5–5.1)
POTASSIUM SERPL-SCNC: 4 MMOL/L (ref 3.5–5.1)
PROT SERPL-MCNC: 5 GM/DL (ref 5.8–7.6)
PROT SERPL-MCNC: 5.2 GM/DL (ref 5.8–7.6)
PROT SERPL-MCNC: 5.3 GM/DL (ref 5.8–7.6)
PROT SERPL-MCNC: 5.9 GM/DL (ref 6.4–8.3)
PROT UR QL STRIP: ABNORMAL
PROTHROMBIN TIME: 21.3 SECONDS (ref 12.5–14.5)
PROTHROMBIN TIME: 25.1 SECONDS (ref 12.5–14.5)
PROTHROMBIN TIME: 27 SECONDS (ref 12.5–14.5)
RBC # BLD AUTO: 2.86 X10(6)/MCL (ref 4.7–6.1)
RBC # BLD AUTO: 2.87 X10(6)/MCL (ref 4.7–6.1)
RBC # BLD AUTO: 2.95 X10(6)/MCL (ref 4.7–6.1)
RBC # BLD AUTO: 3.1 X10(6)/MCL (ref 4.7–6.1)
RBC # BLD AUTO: 3.49 X10(6)/MCL (ref 4.7–6.1)
RBC # BLD AUTO: 3.49 X10(6)/MCL (ref 4.7–6.1)
RBC #/AREA URNS AUTO: ABNORMAL /HPF
RBC MORPH BLD: ABNORMAL
RBC MORPH BLD: ABNORMAL
SAMPLE SITE BLOOD GAS (OHS): ABNORMAL
SAMPLE: ABNORMAL
SAMPLE: ABNORMAL
SAO2 % BLDA: 100 %
SAO2 % BLDA: 92 %
SAO2 % BLDA: 95 %
SAO2 % BLDA: 96 %
SAO2 % BLDA: 97.1 %
SAO2 % BLDA: 97.4 %
SAO2 % BLDA: 97.6 %
SAO2 % BLDA: 99 %
SODIUM BLOOD GAS (OHS): 131 MMOL/L (ref 137–145)
SODIUM BLOOD GAS (OHS): 133 MMOL/L (ref 137–145)
SODIUM BLOOD GAS (OHS): 133 MMOL/L (ref 137–145)
SODIUM BLOOD GAS (OHS): 134 MMOL/L (ref 137–145)
SODIUM BLOOD GAS (OHS): 135 MMOL/L (ref 137–145)
SODIUM BLOOD GAS (OHS): 137 MMOL/L (ref 137–145)
SODIUM BLOOD GAS (OHS): 137 MMOL/L (ref 137–145)
SODIUM BLOOD GAS (OHS): 139 MMOL/L (ref 137–145)
SODIUM BLOOD GAS (OHS): 139 MMOL/L (ref 137–145)
SODIUM BLOOD GAS (OHS): 141 MMOL/L (ref 137–145)
SODIUM SERPL-SCNC: 134 MMOL/L (ref 136–145)
SODIUM SERPL-SCNC: 134 MMOL/L (ref 136–145)
SODIUM SERPL-SCNC: 136 MMOL/L (ref 136–145)
SODIUM SERPL-SCNC: 136 MMOL/L (ref 136–145)
SODIUM SERPL-SCNC: 138 MMOL/L (ref 136–145)
SODIUM SERPL-SCNC: 140 MMOL/L (ref 136–145)
SODIUM SERPL-SCNC: 140 MMOL/L (ref 136–145)
SP GR UR STRIP.AUTO: >1.05 (ref 1–1.03)
SPECIMEN OUTDATE: NORMAL
SPONT+MECH VT ON VENT: 480 ML
SQUAMOUS #/AREA URNS LPF: ABNORMAL /HPF
TARGETS BLD QL SMEAR: ABNORMAL
TARGETS BLD QL SMEAR: ABNORMAL
TDI LATERAL: 0.08 M/S
TDI SEPTAL: 0.08 M/S
TDI: 0.08 M/S
TRICUSPID ANNULAR PLANE SYSTOLIC EXCURSION: 2.85 CM
TROPONIN I SERPL-MCNC: 118.06 NG/ML (ref 0–0.04)
TROPONIN I SERPL-MCNC: 139.82 NG/ML (ref 0–0.04)
TROPONIN I SERPL-MCNC: 26.18 NG/ML (ref 0–0.04)
TROPONIN I SERPL-MCNC: 69.54 NG/ML (ref 0–0.04)
TROPONIN I SERPL-MCNC: 82.14 NG/ML (ref 0–0.04)
UNIT NUMBER: NORMAL
UROBILINOGEN UR STRIP-ACNC: NORMAL
VANCOMYCIN SERPL-MCNC: 9.8 UG/ML (ref 15–20)
VANCOMYCIN TROUGH SERPL-MCNC: 11.9 UG/ML (ref 15–20)
WBC # BLD AUTO: 13.11 X10(3)/MCL (ref 4.5–11.5)
WBC # BLD AUTO: 18.53 X10(3)/MCL (ref 4.5–11.5)
WBC # BLD AUTO: 22.72 X10(3)/MCL (ref 4.5–11.5)
WBC # BLD AUTO: 26.83 X10(3)/MCL (ref 4.5–11.5)
WBC # BLD AUTO: 27.26 X10(3)/MCL (ref 4.5–11.5)
WBC # BLD AUTO: 38.38 X10(3)/MCL (ref 4.5–11.5)
WBC #/AREA URNS AUTO: ABNORMAL /HPF

## 2025-01-01 PROCEDURE — 94003 VENT MGMT INPAT SUBQ DAY: CPT

## 2025-01-01 PROCEDURE — 25000003 PHARM REV CODE 250

## 2025-01-01 PROCEDURE — 94761 N-INVAS EAR/PLS OXIMETRY MLT: CPT | Mod: XB

## 2025-01-01 PROCEDURE — 85025 COMPLETE CBC W/AUTO DIFF WBC: CPT | Performed by: EMERGENCY MEDICINE

## 2025-01-01 PROCEDURE — 36415 COLL VENOUS BLD VENIPUNCTURE: CPT | Performed by: EMERGENCY MEDICINE

## 2025-01-01 PROCEDURE — B2181ZZ FLUOROSCOPY OF LEFT INTERNAL MAMMARY BYPASS GRAFT USING LOW OSMOLAR CONTRAST: ICD-10-PCS | Performed by: STUDENT IN AN ORGANIZED HEALTH CARE EDUCATION/TRAINING PROGRAM

## 2025-01-01 PROCEDURE — 63600175 PHARM REV CODE 636 W HCPCS: Performed by: STUDENT IN AN ORGANIZED HEALTH CARE EDUCATION/TRAINING PROGRAM

## 2025-01-01 PROCEDURE — 83605 ASSAY OF LACTIC ACID: CPT | Performed by: EMERGENCY MEDICINE

## 2025-01-01 PROCEDURE — 25000003 PHARM REV CODE 250: Performed by: EMERGENCY MEDICINE

## 2025-01-01 PROCEDURE — 82803 BLOOD GASES ANY COMBINATION: CPT

## 2025-01-01 PROCEDURE — 37799 UNLISTED PX VASCULAR SURGERY: CPT

## 2025-01-01 PROCEDURE — 11000001 HC ACUTE MED/SURG PRIVATE ROOM

## 2025-01-01 PROCEDURE — 27200966 HC CLOSED SUCTION SYSTEM

## 2025-01-01 PROCEDURE — 02HV33Z INSERTION OF INFUSION DEVICE INTO SUPERIOR VENA CAVA, PERCUTANEOUS APPROACH: ICD-10-PCS | Performed by: INTERNAL MEDICINE

## 2025-01-01 PROCEDURE — 80202 ASSAY OF VANCOMYCIN: CPT | Performed by: STUDENT IN AN ORGANIZED HEALTH CARE EDUCATION/TRAINING PROGRAM

## 2025-01-01 PROCEDURE — 96365 THER/PROPH/DIAG IV INF INIT: CPT | Mod: 59

## 2025-01-01 PROCEDURE — 36415 COLL VENOUS BLD VENIPUNCTURE: CPT

## 2025-01-01 PROCEDURE — 82077 ASSAY SPEC XCP UR&BREATH IA: CPT | Performed by: EMERGENCY MEDICINE

## 2025-01-01 PROCEDURE — 80048 BASIC METABOLIC PNL TOTAL CA: CPT

## 2025-01-01 PROCEDURE — 99900026 HC AIRWAY MAINTENANCE (STAT)

## 2025-01-01 PROCEDURE — 30233N1 TRANSFUSION OF NONAUTOLOGOUS RED BLOOD CELLS INTO PERIPHERAL VEIN, PERCUTANEOUS APPROACH: ICD-10-PCS | Performed by: STUDENT IN AN ORGANIZED HEALTH CARE EDUCATION/TRAINING PROGRAM

## 2025-01-01 PROCEDURE — 99233 SBSQ HOSP IP/OBS HIGH 50: CPT | Mod: ,,, | Performed by: INTERNAL MEDICINE

## 2025-01-01 PROCEDURE — 99900035 HC TECH TIME PER 15 MIN (STAT)

## 2025-01-01 PROCEDURE — 86923 COMPATIBILITY TEST ELECTRIC: CPT | Mod: 91 | Performed by: EMERGENCY MEDICINE

## 2025-01-01 PROCEDURE — 94761 N-INVAS EAR/PLS OXIMETRY MLT: CPT

## 2025-01-01 PROCEDURE — 94760 N-INVAS EAR/PLS OXIMETRY 1: CPT | Mod: XB

## 2025-01-01 PROCEDURE — 27201640 HC PAD, ARTICGEL

## 2025-01-01 PROCEDURE — 63600175 PHARM REV CODE 636 W HCPCS

## 2025-01-01 PROCEDURE — 25000003 PHARM REV CODE 250: Performed by: STUDENT IN AN ORGANIZED HEALTH CARE EDUCATION/TRAINING PROGRAM

## 2025-01-01 PROCEDURE — 93005 ELECTROCARDIOGRAM TRACING: CPT

## 2025-01-01 PROCEDURE — 87086 URINE CULTURE/COLONY COUNT: CPT | Performed by: EMERGENCY MEDICINE

## 2025-01-01 PROCEDURE — 25500020 PHARM REV CODE 255: Performed by: STUDENT IN AN ORGANIZED HEALTH CARE EDUCATION/TRAINING PROGRAM

## 2025-01-01 PROCEDURE — 27100171 HC OXYGEN HIGH FLOW UP TO 24 HOURS

## 2025-01-01 PROCEDURE — C1751 CATH, INF, PER/CENT/MIDLINE: HCPCS

## 2025-01-01 PROCEDURE — 25500020 PHARM REV CODE 255: Performed by: EMERGENCY MEDICINE

## 2025-01-01 PROCEDURE — 84100 ASSAY OF PHOSPHORUS: CPT

## 2025-01-01 PROCEDURE — 94002 VENT MGMT INPAT INIT DAY: CPT

## 2025-01-01 PROCEDURE — 80202 ASSAY OF VANCOMYCIN: CPT | Performed by: INTERNAL MEDICINE

## 2025-01-01 PROCEDURE — 84484 ASSAY OF TROPONIN QUANT: CPT

## 2025-01-01 PROCEDURE — 27000513 HC SENSOR FLOTRAC

## 2025-01-01 PROCEDURE — 36415 COLL VENOUS BLD VENIPUNCTURE: CPT | Performed by: STUDENT IN AN ORGANIZED HEALTH CARE EDUCATION/TRAINING PROGRAM

## 2025-01-01 PROCEDURE — 80053 COMPREHEN METABOLIC PANEL: CPT | Performed by: EMERGENCY MEDICINE

## 2025-01-01 PROCEDURE — 83605 ASSAY OF LACTIC ACID: CPT

## 2025-01-01 PROCEDURE — 5A1945Z RESPIRATORY VENTILATION, 24-96 CONSECUTIVE HOURS: ICD-10-PCS | Performed by: STUDENT IN AN ORGANIZED HEALTH CARE EDUCATION/TRAINING PROGRAM

## 2025-01-01 PROCEDURE — 96374 THER/PROPH/DIAG INJ IV PUSH: CPT | Mod: 59

## 2025-01-01 PROCEDURE — 83735 ASSAY OF MAGNESIUM: CPT | Performed by: EMERGENCY MEDICINE

## 2025-01-01 PROCEDURE — 99291 CRITICAL CARE FIRST HOUR: CPT

## 2025-01-01 PROCEDURE — 94760 N-INVAS EAR/PLS OXIMETRY 1: CPT

## 2025-01-01 PROCEDURE — 93461 R&L HRT ART/VENTRICLE ANGIO: CPT | Performed by: STUDENT IN AN ORGANIZED HEALTH CARE EDUCATION/TRAINING PROGRAM

## 2025-01-01 PROCEDURE — 82947 ASSAY GLUCOSE BLOOD QUANT: CPT

## 2025-01-01 PROCEDURE — 99900031 HC PATIENT EDUCATION (STAT)

## 2025-01-01 PROCEDURE — 4A023N8 MEASUREMENT OF CARDIAC SAMPLING AND PRESSURE, BILATERAL, PERCUTANEOUS APPROACH: ICD-10-PCS | Performed by: STUDENT IN AN ORGANIZED HEALTH CARE EDUCATION/TRAINING PROGRAM

## 2025-01-01 PROCEDURE — 96375 TX/PRO/DX INJ NEW DRUG ADDON: CPT

## 2025-01-01 PROCEDURE — G0390 TRAUMA RESPONS W/HOSP CRITI: HCPCS

## 2025-01-01 PROCEDURE — 63600175 PHARM REV CODE 636 W HCPCS: Performed by: INTERNAL MEDICINE

## 2025-01-01 PROCEDURE — 83690 ASSAY OF LIPASE: CPT | Performed by: EMERGENCY MEDICINE

## 2025-01-01 PROCEDURE — 36556 INSERT NON-TUNNEL CV CATH: CPT

## 2025-01-01 PROCEDURE — 85027 COMPLETE CBC AUTOMATED: CPT | Performed by: STUDENT IN AN ORGANIZED HEALTH CARE EDUCATION/TRAINING PROGRAM

## 2025-01-01 PROCEDURE — 85610 PROTHROMBIN TIME: CPT | Performed by: EMERGENCY MEDICINE

## 2025-01-01 PROCEDURE — 85610 PROTHROMBIN TIME: CPT

## 2025-01-01 PROCEDURE — C1894 INTRO/SHEATH, NON-LASER: HCPCS | Performed by: STUDENT IN AN ORGANIZED HEALTH CARE EDUCATION/TRAINING PROGRAM

## 2025-01-01 PROCEDURE — 85730 THROMBOPLASTIN TIME PARTIAL: CPT | Performed by: EMERGENCY MEDICINE

## 2025-01-01 PROCEDURE — 31500 INSERT EMERGENCY AIRWAY: CPT

## 2025-01-01 PROCEDURE — 93010 ELECTROCARDIOGRAM REPORT: CPT | Mod: ,,, | Performed by: INTERNAL MEDICINE

## 2025-01-01 PROCEDURE — 84100 ASSAY OF PHOSPHORUS: CPT | Performed by: STUDENT IN AN ORGANIZED HEALTH CARE EDUCATION/TRAINING PROGRAM

## 2025-01-01 PROCEDURE — C1751 CATH, INF, PER/CENT/MIDLINE: HCPCS | Performed by: STUDENT IN AN ORGANIZED HEALTH CARE EDUCATION/TRAINING PROGRAM

## 2025-01-01 PROCEDURE — 25000003 PHARM REV CODE 250: Performed by: INTERNAL MEDICINE

## 2025-01-01 PROCEDURE — 84484 ASSAY OF TROPONIN QUANT: CPT | Performed by: EMERGENCY MEDICINE

## 2025-01-01 PROCEDURE — 36415 COLL VENOUS BLD VENIPUNCTURE: CPT | Mod: XB | Performed by: STUDENT IN AN ORGANIZED HEALTH CARE EDUCATION/TRAINING PROGRAM

## 2025-01-01 PROCEDURE — 5A2204Z RESTORATION OF CARDIAC RHYTHM, SINGLE: ICD-10-PCS | Performed by: STUDENT IN AN ORGANIZED HEALTH CARE EDUCATION/TRAINING PROGRAM

## 2025-01-01 PROCEDURE — C1769 GUIDE WIRE: HCPCS | Performed by: STUDENT IN AN ORGANIZED HEALTH CARE EDUCATION/TRAINING PROGRAM

## 2025-01-01 PROCEDURE — 87040 BLOOD CULTURE FOR BACTERIA: CPT

## 2025-01-01 PROCEDURE — 85018 HEMOGLOBIN: CPT

## 2025-01-01 PROCEDURE — 83735 ASSAY OF MAGNESIUM: CPT | Performed by: STUDENT IN AN ORGANIZED HEALTH CARE EDUCATION/TRAINING PROGRAM

## 2025-01-01 PROCEDURE — 36600 WITHDRAWAL OF ARTERIAL BLOOD: CPT

## 2025-01-01 PROCEDURE — 99223 1ST HOSP IP/OBS HIGH 75: CPT | Mod: ,,, | Performed by: INTERNAL MEDICINE

## 2025-01-01 PROCEDURE — B2151ZZ FLUOROSCOPY OF LEFT HEART USING LOW OSMOLAR CONTRAST: ICD-10-PCS | Performed by: STUDENT IN AN ORGANIZED HEALTH CARE EDUCATION/TRAINING PROGRAM

## 2025-01-01 PROCEDURE — 80053 COMPREHEN METABOLIC PANEL: CPT | Performed by: STUDENT IN AN ORGANIZED HEALTH CARE EDUCATION/TRAINING PROGRAM

## 2025-01-01 PROCEDURE — 85007 BL SMEAR W/DIFF WBC COUNT: CPT | Performed by: EMERGENCY MEDICINE

## 2025-01-01 PROCEDURE — 63600175 PHARM REV CODE 636 W HCPCS: Performed by: NURSE PRACTITIONER

## 2025-01-01 PROCEDURE — 82550 ASSAY OF CK (CPK): CPT

## 2025-01-01 PROCEDURE — 80307 DRUG TEST PRSMV CHEM ANLYZR: CPT | Performed by: EMERGENCY MEDICINE

## 2025-01-01 PROCEDURE — 83735 ASSAY OF MAGNESIUM: CPT

## 2025-01-01 PROCEDURE — 81001 URINALYSIS AUTO W/SCOPE: CPT | Mod: XB | Performed by: EMERGENCY MEDICINE

## 2025-01-01 PROCEDURE — 80053 COMPREHEN METABOLIC PANEL: CPT | Performed by: NURSE PRACTITIONER

## 2025-01-01 PROCEDURE — 63600175 PHARM REV CODE 636 W HCPCS: Performed by: EMERGENCY MEDICINE

## 2025-01-01 PROCEDURE — 83520 IMMUNOASSAY QUANT NOS NONAB: CPT

## 2025-01-01 PROCEDURE — P9016 RBC LEUKOCYTES REDUCED: HCPCS | Performed by: EMERGENCY MEDICINE

## 2025-01-01 PROCEDURE — 0BH17EZ INSERTION OF ENDOTRACHEAL AIRWAY INTO TRACHEA, VIA NATURAL OR ARTIFICIAL OPENING: ICD-10-PCS | Performed by: EMERGENCY MEDICINE

## 2025-01-01 PROCEDURE — 82962 GLUCOSE BLOOD TEST: CPT

## 2025-01-01 PROCEDURE — 85014 HEMATOCRIT: CPT

## 2025-01-01 PROCEDURE — 27201423 OPTIME MED/SURG SUP & DEVICES STERILE SUPPLY: Performed by: STUDENT IN AN ORGANIZED HEALTH CARE EDUCATION/TRAINING PROGRAM

## 2025-01-01 PROCEDURE — 75630 X-RAY AORTA LEG ARTERIES: CPT | Mod: 59 | Performed by: STUDENT IN AN ORGANIZED HEALTH CARE EDUCATION/TRAINING PROGRAM

## 2025-01-01 PROCEDURE — 83605 ASSAY OF LACTIC ACID: CPT | Performed by: STUDENT IN AN ORGANIZED HEALTH CARE EDUCATION/TRAINING PROGRAM

## 2025-01-01 PROCEDURE — 36430 TRANSFUSION BLD/BLD COMPNT: CPT

## 2025-01-01 PROCEDURE — 99231 SBSQ HOSP IP/OBS SF/LOW 25: CPT | Mod: ,,, | Performed by: INTERNAL MEDICINE

## 2025-01-01 PROCEDURE — 86900 BLOOD TYPING SEROLOGIC ABO: CPT | Performed by: EMERGENCY MEDICINE

## 2025-01-01 PROCEDURE — B41D1ZZ FLUOROSCOPY OF AORTA AND BILATERAL LOWER EXTREMITY ARTERIES USING LOW OSMOLAR CONTRAST: ICD-10-PCS | Performed by: STUDENT IN AN ORGANIZED HEALTH CARE EDUCATION/TRAINING PROGRAM

## 2025-01-01 PROCEDURE — B2111ZZ FLUOROSCOPY OF MULTIPLE CORONARY ARTERIES USING LOW OSMOLAR CONTRAST: ICD-10-PCS | Performed by: STUDENT IN AN ORGANIZED HEALTH CARE EDUCATION/TRAINING PROGRAM

## 2025-01-01 RX ORDER — GLUCAGON 1 MG
1 KIT INJECTION
Status: DISCONTINUED | OUTPATIENT
Start: 2025-01-01 | End: 2025-04-03 | Stop reason: HOSPADM

## 2025-01-01 RX ORDER — CALCIUM CHLORIDE INJECTION 100 MG/ML
INJECTION, SOLUTION INTRAVENOUS CODE/TRAUMA/SEDATION MEDICATION
Status: COMPLETED | OUTPATIENT
Start: 2025-01-01 | End: 2025-01-01

## 2025-01-01 RX ORDER — PANTOPRAZOLE SODIUM 40 MG/10ML
40 INJECTION, POWDER, LYOPHILIZED, FOR SOLUTION INTRAVENOUS 2 TIMES DAILY
Status: DISCONTINUED | OUTPATIENT
Start: 2025-01-01 | End: 2025-04-03 | Stop reason: HOSPADM

## 2025-01-01 RX ORDER — NOREPINEPHRINE BITARTRATE/D5W 8 MG/250ML
0-3 PLASTIC BAG, INJECTION (ML) INTRAVENOUS CONTINUOUS
Status: DISCONTINUED | OUTPATIENT
Start: 2025-01-01 | End: 2025-01-01

## 2025-01-01 RX ORDER — FENTANYL CITRATE-0.9 % NACL/PF 10 MCG/ML
0-250 PLASTIC BAG, INJECTION (ML) INTRAVENOUS CONTINUOUS
Status: DISCONTINUED | OUTPATIENT
Start: 2025-01-01 | End: 2025-04-03 | Stop reason: HOSPADM

## 2025-01-01 RX ORDER — MAGNESIUM SULFATE HEPTAHYDRATE 40 MG/ML
2 INJECTION, SOLUTION INTRAVENOUS
Status: DISCONTINUED | OUTPATIENT
Start: 2025-01-01 | End: 2025-04-03 | Stop reason: HOSPADM

## 2025-01-01 RX ORDER — SODIUM BICARBONATE 1 MEQ/ML
SYRINGE (ML) INTRAVENOUS CODE/TRAUMA/SEDATION MEDICATION
Status: COMPLETED | OUTPATIENT
Start: 2025-01-01 | End: 2025-01-01

## 2025-01-01 RX ORDER — VALSARTAN 320 MG/1
320 TABLET ORAL DAILY
COMMUNITY
Start: 2025-01-01

## 2025-01-01 RX ORDER — MIDAZOLAM HYDROCHLORIDE 1 MG/ML
INJECTION, SOLUTION INTRAMUSCULAR; INTRAVENOUS
Status: DISCONTINUED | OUTPATIENT
Start: 2025-01-01 | End: 2025-01-01 | Stop reason: HOSPADM

## 2025-01-01 RX ORDER — NOREPINEPHRINE BITARTRATE/D5W 4MG/250ML
PLASTIC BAG, INJECTION (ML) INTRAVENOUS
Status: DISCONTINUED | OUTPATIENT
Start: 2025-01-01 | End: 2025-01-01

## 2025-01-01 RX ORDER — ATROPINE SULFATE 0.1 MG/ML
INJECTION INTRAVENOUS
Status: DISCONTINUED
Start: 2025-01-01 | End: 2025-01-01 | Stop reason: WASHOUT

## 2025-01-01 RX ORDER — HEPARIN SODIUM 5000 [USP'U]/ML
INJECTION, SOLUTION INTRAVENOUS; SUBCUTANEOUS
Status: DISCONTINUED
Start: 2025-01-01 | End: 2025-01-01 | Stop reason: WASHOUT

## 2025-01-01 RX ORDER — CALCIUM GLUCONATE 20 MG/ML
2 INJECTION, SOLUTION INTRAVENOUS
Status: DISCONTINUED | OUTPATIENT
Start: 2025-01-01 | End: 2025-04-03 | Stop reason: HOSPADM

## 2025-01-01 RX ORDER — INDOMETHACIN 25 MG/1
100 CAPSULE ORAL ONCE
Status: COMPLETED | OUTPATIENT
Start: 2025-01-01 | End: 2025-01-01

## 2025-01-01 RX ORDER — HEPARIN SODIUM,PORCINE/D5W 25000/250
0-40 INTRAVENOUS SOLUTION INTRAVENOUS CONTINUOUS
Status: DISCONTINUED | OUTPATIENT
Start: 2025-01-01 | End: 2025-04-03 | Stop reason: HOSPADM

## 2025-01-01 RX ORDER — AMLODIPINE BESYLATE 10 MG/1
10 TABLET ORAL DAILY
COMMUNITY
Start: 2025-01-01

## 2025-01-01 RX ORDER — FENTANYL CITRATE 50 UG/ML
50 INJECTION, SOLUTION INTRAMUSCULAR; INTRAVENOUS
Refills: 0 | Status: DISCONTINUED | OUTPATIENT
Start: 2025-01-01 | End: 2025-04-03 | Stop reason: HOSPADM

## 2025-01-01 RX ORDER — ACETAMINOPHEN 325 MG/1
650 TABLET ORAL EVERY 4 HOURS PRN
Status: DISCONTINUED | OUTPATIENT
Start: 2025-01-01 | End: 2025-04-03 | Stop reason: HOSPADM

## 2025-01-01 RX ORDER — HYDRALAZINE HYDROCHLORIDE 20 MG/ML
10 INJECTION INTRAMUSCULAR; INTRAVENOUS EVERY 4 HOURS PRN
Status: DISCONTINUED | OUTPATIENT
Start: 2025-01-01 | End: 2025-04-03 | Stop reason: HOSPADM

## 2025-01-01 RX ORDER — SODIUM BICARBONATE 1 MEQ/ML
VIAL (ML) INTRAVENOUS
Status: COMPLETED | OUTPATIENT
Start: 2025-01-01 | End: 2025-01-01

## 2025-01-01 RX ORDER — ROSUVASTATIN CALCIUM 20 MG/1
20 TABLET, COATED ORAL NIGHTLY
COMMUNITY
Start: 2025-01-01

## 2025-01-01 RX ORDER — SODIUM CHLORIDE 9 MG/ML
INJECTION, SOLUTION INTRAVENOUS CONTINUOUS
Status: ACTIVE | OUTPATIENT
Start: 2025-01-01 | End: 2025-01-01

## 2025-01-01 RX ORDER — ONDANSETRON 4 MG/1
8 TABLET, ORALLY DISINTEGRATING ORAL EVERY 8 HOURS PRN
Status: DISCONTINUED | OUTPATIENT
Start: 2025-01-01 | End: 2025-04-03 | Stop reason: HOSPADM

## 2025-01-01 RX ORDER — EPINEPHRINE 0.1 MG/ML
INJECTION INTRAVENOUS CODE/TRAUMA/SEDATION MEDICATION
Status: COMPLETED | OUTPATIENT
Start: 2025-01-01 | End: 2025-01-01

## 2025-01-01 RX ORDER — POTASSIUM CHLORIDE 14.9 MG/ML
60 INJECTION INTRAVENOUS
Status: DISCONTINUED | OUTPATIENT
Start: 2025-01-01 | End: 2025-04-03 | Stop reason: HOSPADM

## 2025-01-01 RX ORDER — ASPIRIN 300 MG/1
600 SUPPOSITORY RECTAL
Status: COMPLETED | OUTPATIENT
Start: 2025-01-01 | End: 2025-01-01

## 2025-01-01 RX ORDER — CALCIUM GLUCONATE 20 MG/ML
3 INJECTION, SOLUTION INTRAVENOUS
Status: DISCONTINUED | OUTPATIENT
Start: 2025-01-01 | End: 2025-04-03 | Stop reason: HOSPADM

## 2025-01-01 RX ORDER — FENTANYL CITRATE 50 UG/ML
INJECTION, SOLUTION INTRAMUSCULAR; INTRAVENOUS
Status: DISPENSED
Start: 2025-01-01 | End: 2025-01-01

## 2025-01-01 RX ORDER — FENTANYL CITRATE 50 UG/ML
50 INJECTION, SOLUTION INTRAMUSCULAR; INTRAVENOUS
Status: DISCONTINUED | OUTPATIENT
Start: 2025-01-01 | End: 2025-04-03 | Stop reason: HOSPADM

## 2025-01-01 RX ORDER — SODIUM CHLORIDE 0.9 % (FLUSH) 0.9 %
10 SYRINGE (ML) INJECTION
Status: DISCONTINUED | OUTPATIENT
Start: 2025-01-01 | End: 2025-04-03 | Stop reason: HOSPADM

## 2025-01-01 RX ORDER — NOREPINEPHRINE BITARTRATE/D5W 8 MG/250ML
PLASTIC BAG, INJECTION (ML) INTRAVENOUS
Status: DISPENSED
Start: 2025-01-01 | End: 2025-01-01

## 2025-01-01 RX ORDER — INSULIN ASPART 100 [IU]/ML
0-10 INJECTION, SOLUTION INTRAVENOUS; SUBCUTANEOUS EVERY 6 HOURS PRN
Status: DISCONTINUED | OUTPATIENT
Start: 2025-01-01 | End: 2025-04-03 | Stop reason: HOSPADM

## 2025-01-01 RX ORDER — CALCIUM GLUCONATE 20 MG/ML
1 INJECTION, SOLUTION INTRAVENOUS
Status: DISPENSED | OUTPATIENT
Start: 2025-01-01 | End: 2025-01-01

## 2025-01-01 RX ORDER — CEFEPIME HYDROCHLORIDE 1 G/1
1 INJECTION, POWDER, FOR SOLUTION INTRAMUSCULAR; INTRAVENOUS
Status: DISCONTINUED | OUTPATIENT
Start: 2025-01-01 | End: 2025-01-01

## 2025-01-01 RX ORDER — FENTANYL CITRATE 50 UG/ML
50 INJECTION, SOLUTION INTRAMUSCULAR; INTRAVENOUS
Refills: 0 | Status: DISPENSED | OUTPATIENT
Start: 2025-01-01 | End: 2025-01-01

## 2025-01-01 RX ORDER — POTASSIUM CHLORIDE 14.9 MG/ML
40 INJECTION INTRAVENOUS
Status: DISCONTINUED | OUTPATIENT
Start: 2025-01-01 | End: 2025-04-03 | Stop reason: HOSPADM

## 2025-01-01 RX ORDER — METOPROLOL SUCCINATE 100 MG/1
100 TABLET, EXTENDED RELEASE ORAL DAILY
COMMUNITY
Start: 2025-01-01

## 2025-01-01 RX ORDER — MIDAZOLAM HYDROCHLORIDE 1 MG/ML
INJECTION, SOLUTION INTRAMUSCULAR; INTRAVENOUS
Status: DISCONTINUED | OUTPATIENT
Start: 2025-01-01 | End: 2025-04-03 | Stop reason: HOSPADM

## 2025-01-01 RX ORDER — PROPOFOL 10 MG/ML
0-50 INJECTION, EMULSION INTRAVENOUS CONTINUOUS
Status: DISCONTINUED | OUTPATIENT
Start: 2025-01-01 | End: 2025-04-03 | Stop reason: HOSPADM

## 2025-01-01 RX ORDER — NALOXONE HCL 0.4 MG/ML
VIAL (ML) INJECTION CODE/TRAUMA/SEDATION MEDICATION
Status: COMPLETED | OUTPATIENT
Start: 2025-01-01 | End: 2025-01-01

## 2025-01-01 RX ORDER — MUPIROCIN 20 MG/G
OINTMENT TOPICAL 2 TIMES DAILY
Status: DISCONTINUED | OUTPATIENT
Start: 2025-01-01 | End: 2025-04-03 | Stop reason: HOSPADM

## 2025-01-01 RX ORDER — CALCIUM GLUCONATE 20 MG/ML
1 INJECTION, SOLUTION INTRAVENOUS
Status: DISCONTINUED | OUTPATIENT
Start: 2025-01-01 | End: 2025-04-03 | Stop reason: HOSPADM

## 2025-01-01 RX ORDER — POTASSIUM CHLORIDE 14.9 MG/ML
80 INJECTION INTRAVENOUS
Status: DISCONTINUED | OUTPATIENT
Start: 2025-01-01 | End: 2025-04-03 | Stop reason: HOSPADM

## 2025-01-01 RX ORDER — MORPHINE SULFATE 4 MG/ML
2 INJECTION, SOLUTION INTRAMUSCULAR; INTRAVENOUS EVERY 4 HOURS PRN
Refills: 0 | Status: DISCONTINUED | OUTPATIENT
Start: 2025-01-01 | End: 2025-04-03 | Stop reason: HOSPADM

## 2025-01-01 RX ORDER — HYDROCODONE BITARTRATE AND ACETAMINOPHEN 5; 325 MG/1; MG/1
1 TABLET ORAL EVERY 4 HOURS PRN
Refills: 0 | Status: DISCONTINUED | OUTPATIENT
Start: 2025-01-01 | End: 2025-04-03 | Stop reason: HOSPADM

## 2025-01-01 RX ORDER — CEFEPIME HYDROCHLORIDE 2 G/1
2 INJECTION, POWDER, FOR SOLUTION INTRAVENOUS
Status: DISCONTINUED | OUTPATIENT
Start: 2025-01-01 | End: 2025-04-03 | Stop reason: HOSPADM

## 2025-01-01 RX ORDER — FENTANYL CITRATE 50 UG/ML
INJECTION, SOLUTION INTRAMUSCULAR; INTRAVENOUS CODE/TRAUMA/SEDATION MEDICATION
Status: COMPLETED | OUTPATIENT
Start: 2025-01-01 | End: 2025-01-01

## 2025-01-01 RX ADMIN — INSULIN ASPART 2 UNITS: 100 INJECTION, SOLUTION INTRAVENOUS; SUBCUTANEOUS at 05:04

## 2025-01-01 RX ADMIN — SODIUM BICARBONATE: 84 INJECTION, SOLUTION INTRAVENOUS at 04:03

## 2025-01-01 RX ADMIN — VASOPRESSIN 0.04 UNITS/MIN: 20 INJECTION INTRAVENOUS at 09:04

## 2025-01-01 RX ADMIN — MUPIROCIN: 20 OINTMENT TOPICAL at 08:03

## 2025-01-01 RX ADMIN — PROPOFOL 20 MCG/KG/MIN: 10 INJECTION, EMULSION INTRAVENOUS at 05:03

## 2025-01-01 RX ADMIN — EPINEPHRINE 1 MG: 0.1 INJECTION INTRAVENOUS at 10:03

## 2025-01-01 RX ADMIN — HYDROCORTISONE SODIUM SUCCINATE 100 MG: 100 INJECTION, POWDER, FOR SOLUTION INTRAMUSCULAR; INTRAVENOUS at 03:04

## 2025-01-01 RX ADMIN — PERFLUTREN 1.3 ML: 6.52 INJECTION, SUSPENSION INTRAVENOUS at 07:03

## 2025-01-01 RX ADMIN — VASOPRESSIN 0.04 UNITS/MIN: 20 INJECTION INTRAVENOUS at 03:04

## 2025-01-01 RX ADMIN — CEFEPIME 2 G: 2 INJECTION, POWDER, FOR SOLUTION INTRAVENOUS at 01:04

## 2025-01-01 RX ADMIN — MUPIROCIN: 20 OINTMENT TOPICAL at 08:04

## 2025-01-01 RX ADMIN — MORPHINE SULFATE 2 MG: 4 INJECTION INTRAVENOUS at 08:04

## 2025-01-01 RX ADMIN — INSULIN ASPART 2 UNITS: 100 INJECTION, SOLUTION INTRAVENOUS; SUBCUTANEOUS at 10:03

## 2025-01-01 RX ADMIN — VANCOMYCIN HYDROCHLORIDE 1000 MG: 1 INJECTION, POWDER, LYOPHILIZED, FOR SOLUTION INTRAVENOUS at 09:03

## 2025-01-01 RX ADMIN — SODIUM BICARBONATE: 84 INJECTION, SOLUTION INTRAVENOUS at 11:03

## 2025-01-01 RX ADMIN — PROPOFOL 40 MCG/KG/MIN: 10 INJECTION, EMULSION INTRAVENOUS at 05:03

## 2025-01-01 RX ADMIN — VANCOMYCIN HYDROCHLORIDE 1000 MG: 1 INJECTION, POWDER, LYOPHILIZED, FOR SOLUTION INTRAVENOUS at 11:03

## 2025-01-01 RX ADMIN — VASOPRESSIN 0.04 UNITS/MIN: 20 INJECTION INTRAVENOUS at 01:03

## 2025-01-01 RX ADMIN — HYDROCORTISONE SODIUM SUCCINATE 100 MG: 100 INJECTION, POWDER, FOR SOLUTION INTRAMUSCULAR; INTRAVENOUS at 07:03

## 2025-01-01 RX ADMIN — SODIUM CHLORIDE, POTASSIUM CHLORIDE, SODIUM LACTATE AND CALCIUM CHLORIDE 1000 ML: 600; 310; 30; 20 INJECTION, SOLUTION INTRAVENOUS at 04:03

## 2025-01-01 RX ADMIN — MUPIROCIN: 20 OINTMENT TOPICAL at 09:03

## 2025-01-01 RX ADMIN — EPINEPHRINE 1 MG: 0.1 INJECTION INTRAVENOUS at 09:03

## 2025-01-01 RX ADMIN — INSULIN ASPART 2 UNITS: 100 INJECTION, SOLUTION INTRAVENOUS; SUBCUTANEOUS at 05:03

## 2025-01-01 RX ADMIN — NOREPINEPHRINE BITARTRATE 0.6 MCG/KG/MIN: 1 INJECTION, SOLUTION, CONCENTRATE INTRAVENOUS at 06:03

## 2025-01-01 RX ADMIN — PANTOPRAZOLE SODIUM 40 MG: 40 INJECTION, POWDER, LYOPHILIZED, FOR SOLUTION INTRAVENOUS at 08:04

## 2025-01-01 RX ADMIN — PANTOPRAZOLE SODIUM 40 MG: 40 INJECTION, POWDER, LYOPHILIZED, FOR SOLUTION INTRAVENOUS at 08:03

## 2025-01-01 RX ADMIN — SODIUM BICARBONATE 100 MEQ: 84 INJECTION INTRAVENOUS at 10:03

## 2025-01-01 RX ADMIN — POTASSIUM CHLORIDE 40 MEQ: 14.9 INJECTION, SOLUTION INTRAVENOUS at 01:03

## 2025-01-01 RX ADMIN — NOREPINEPHRINE BITARTRATE 0.3 MCG/KG/MIN: 1 INJECTION, SOLUTION, CONCENTRATE INTRAVENOUS at 05:03

## 2025-01-01 RX ADMIN — PROPOFOL 40 MCG/KG/MIN: 10 INJECTION, EMULSION INTRAVENOUS at 06:03

## 2025-01-01 RX ADMIN — NOREPINEPHRINE BITARTRATE 0.02 MCG/KG/MIN: 1 INJECTION, SOLUTION, CONCENTRATE INTRAVENOUS at 10:03

## 2025-01-01 RX ADMIN — PROPOFOL 40 MCG/KG/MIN: 10 INJECTION, EMULSION INTRAVENOUS at 10:03

## 2025-01-01 RX ADMIN — INSULIN ASPART 4 UNITS: 100 INJECTION, SOLUTION INTRAVENOUS; SUBCUTANEOUS at 09:03

## 2025-01-01 RX ADMIN — NALOXONE HYDROCHLORIDE 0.8 MG: 0.4 INJECTION, SOLUTION INTRAMUSCULAR; INTRAVENOUS; SUBCUTANEOUS at 09:03

## 2025-01-01 RX ADMIN — VASOPRESSIN 0.04 UNITS/MIN: 20 INJECTION INTRAVENOUS at 06:04

## 2025-01-01 RX ADMIN — HEPARIN SODIUM 12 UNITS/KG/HR: 10000 INJECTION, SOLUTION INTRAVENOUS at 10:03

## 2025-01-01 RX ADMIN — INSULIN ASPART 4 UNITS: 100 INJECTION, SOLUTION INTRAVENOUS; SUBCUTANEOUS at 04:03

## 2025-01-01 RX ADMIN — NOREPINEPHRINE BITARTRATE 0.7 MCG/KG/MIN: 8 INJECTION, SOLUTION INTRAVENOUS at 12:03

## 2025-01-01 RX ADMIN — PROPOFOL 20 MCG/KG/MIN: 10 INJECTION, EMULSION INTRAVENOUS at 01:03

## 2025-01-01 RX ADMIN — CEFEPIME 2 G: 2 INJECTION, POWDER, FOR SOLUTION INTRAVENOUS at 04:03

## 2025-01-01 RX ADMIN — CALCIUM GLUCONATE 1 G: 20 INJECTION, SOLUTION INTRAVENOUS at 10:04

## 2025-01-01 RX ADMIN — CEFEPIME 2 G: 2 INJECTION, POWDER, FOR SOLUTION INTRAVENOUS at 04:04

## 2025-01-01 RX ADMIN — NOREPINEPHRINE BITARTRATE 0.18 MCG/KG/MIN: 1 INJECTION, SOLUTION, CONCENTRATE INTRAVENOUS at 08:03

## 2025-01-01 RX ADMIN — HYDROCORTISONE SODIUM SUCCINATE 100 MG: 100 INJECTION, POWDER, FOR SOLUTION INTRAMUSCULAR; INTRAVENOUS at 09:03

## 2025-01-01 RX ADMIN — CEFEPIME 2 G: 2 INJECTION, POWDER, FOR SOLUTION INTRAVENOUS at 08:04

## 2025-01-01 RX ADMIN — VANCOMYCIN HYDROCHLORIDE 1000 MG: 1 INJECTION, POWDER, LYOPHILIZED, FOR SOLUTION INTRAVENOUS at 07:04

## 2025-01-01 RX ADMIN — INSULIN ASPART 4 UNITS: 100 INJECTION, SOLUTION INTRAVENOUS; SUBCUTANEOUS at 04:04

## 2025-01-01 RX ADMIN — HYDROCORTISONE SODIUM SUCCINATE 100 MG: 100 INJECTION, POWDER, FOR SOLUTION INTRAMUSCULAR; INTRAVENOUS at 01:03

## 2025-01-01 RX ADMIN — CEFEPIME 1 G: 1 INJECTION, POWDER, FOR SOLUTION INTRAMUSCULAR; INTRAVENOUS at 09:03

## 2025-01-01 RX ADMIN — POTASSIUM BICARBONATE 25 MEQ: 978 TABLET, EFFERVESCENT ORAL at 08:04

## 2025-01-01 RX ADMIN — CALCIUM GLUCONATE 1 G: 20 INJECTION, SOLUTION INTRAVENOUS at 08:04

## 2025-01-01 RX ADMIN — HYDROCORTISONE SODIUM SUCCINATE 100 MG: 100 INJECTION, POWDER, FOR SOLUTION INTRAMUSCULAR; INTRAVENOUS at 05:04

## 2025-01-01 RX ADMIN — AMIODARONE HYDROCHLORIDE 1 MG/MIN: 1.8 INJECTION, SOLUTION INTRAVENOUS at 02:03

## 2025-01-01 RX ADMIN — HYDROCORTISONE SODIUM SUCCINATE 100 MG: 100 INJECTION, POWDER, FOR SOLUTION INTRAMUSCULAR; INTRAVENOUS at 02:03

## 2025-01-01 RX ADMIN — AMIODARONE HYDROCHLORIDE 0.5 MG/MIN: 1.8 INJECTION, SOLUTION INTRAVENOUS at 02:03

## 2025-01-01 RX ADMIN — AMIODARONE HYDROCHLORIDE 0.5 MG/MIN: 1.8 INJECTION, SOLUTION INTRAVENOUS at 05:03

## 2025-01-01 RX ADMIN — IOHEXOL 100 ML: 350 INJECTION, SOLUTION INTRAVENOUS at 10:03

## 2025-01-01 RX ADMIN — VASOPRESSIN 0.04 UNITS/MIN: 20 INJECTION INTRAVENOUS at 01:04

## 2025-01-01 RX ADMIN — NOREPINEPHRINE BITARTRATE 0.1 MCG/KG/MIN: 1 INJECTION, SOLUTION, CONCENTRATE INTRAVENOUS at 07:04

## 2025-01-01 RX ADMIN — HYDROCORTISONE SODIUM SUCCINATE 100 MG: 100 INJECTION, POWDER, FOR SOLUTION INTRAMUSCULAR; INTRAVENOUS at 10:04

## 2025-01-01 RX ADMIN — ASPIRIN 600 MG: 300 SUPPOSITORY RECTAL at 10:03

## 2025-01-01 RX ADMIN — HYDROCORTISONE SODIUM SUCCINATE 100 MG: 100 INJECTION, POWDER, FOR SOLUTION INTRAMUSCULAR; INTRAVENOUS at 05:03

## 2025-01-01 RX ADMIN — CEFEPIME 2 G: 2 INJECTION, POWDER, FOR SOLUTION INTRAVENOUS at 12:04

## 2025-01-01 RX ADMIN — CALCIUM CHLORIDE INJECTION 1 G: 100 INJECTION, SOLUTION INTRAVENOUS at 10:03

## 2025-01-01 RX ADMIN — FENTANYL CITRATE 100 MCG: 50 INJECTION, SOLUTION INTRAMUSCULAR; INTRAVENOUS at 10:03

## 2025-01-01 RX ADMIN — PANTOPRAZOLE SODIUM 40 MG: 40 INJECTION, POWDER, LYOPHILIZED, FOR SOLUTION INTRAVENOUS at 10:03

## 2025-01-01 RX ADMIN — NOREPINEPHRINE BITARTRATE 0.64 MCG/KG/MIN: 1 INJECTION, SOLUTION, CONCENTRATE INTRAVENOUS at 01:03

## 2025-01-01 RX ADMIN — VANCOMYCIN HYDROCHLORIDE 1000 MG: 1 INJECTION, POWDER, LYOPHILIZED, FOR SOLUTION INTRAVENOUS at 08:04

## 2025-01-01 RX ADMIN — HYDROCORTISONE SODIUM SUCCINATE 100 MG: 100 INJECTION, POWDER, FOR SOLUTION INTRAMUSCULAR; INTRAVENOUS at 01:04

## 2025-01-01 RX ADMIN — PANTOPRAZOLE SODIUM 40 MG: 40 INJECTION, POWDER, LYOPHILIZED, FOR SOLUTION INTRAVENOUS at 09:03

## 2025-01-01 RX ADMIN — SODIUM BICARBONATE: 84 INJECTION, SOLUTION INTRAVENOUS at 06:03

## 2025-01-01 RX ADMIN — SODIUM BICARBONATE: 84 INJECTION, SOLUTION INTRAVENOUS at 03:03

## 2025-01-01 RX ADMIN — SODIUM BICARBONATE 100 MEQ: 84 INJECTION, SOLUTION INTRAVENOUS at 01:03

## 2025-01-01 RX ADMIN — SODIUM BICARBONATE 100 MEQ: 84 INJECTION INTRAVENOUS at 09:03

## 2025-01-01 RX ADMIN — VASOPRESSIN 0.04 UNITS/MIN: 20 INJECTION INTRAVENOUS at 08:03

## 2025-01-01 RX ADMIN — SODIUM CHLORIDE: 9 INJECTION, SOLUTION INTRAVENOUS at 04:03

## 2025-01-01 RX ADMIN — CALCIUM GLUCONATE 1 G: 20 INJECTION, SOLUTION INTRAVENOUS at 09:04

## 2025-01-01 RX ADMIN — SODIUM CHLORIDE, POTASSIUM CHLORIDE, SODIUM LACTATE AND CALCIUM CHLORIDE 1000 ML: 600; 310; 30; 20 INJECTION, SOLUTION INTRAVENOUS at 01:03

## 2025-01-01 RX ADMIN — SODIUM BICARBONATE: 84 INJECTION, SOLUTION INTRAVENOUS at 12:03

## 2025-01-01 RX ADMIN — NOREPINEPHRINE BITARTRATE 0.46 MCG/KG/MIN: 8 INJECTION, SOLUTION INTRAVENOUS at 03:03

## 2025-01-01 RX ADMIN — FENTANYL CITRATE 50 MCG: 50 INJECTION, SOLUTION INTRAMUSCULAR; INTRAVENOUS at 12:04

## 2025-01-01 RX ADMIN — MAGNESIUM SULFATE HEPTAHYDRATE 2 G: 40 INJECTION, SOLUTION INTRAVENOUS at 03:03

## 2025-01-01 RX ADMIN — VASOPRESSIN 0.04 UNITS/MIN: 20 INJECTION INTRAVENOUS at 05:03

## 2025-01-01 RX ADMIN — INSULIN ASPART 4 UNITS: 100 INJECTION, SOLUTION INTRAVENOUS; SUBCUTANEOUS at 05:03

## 2025-01-01 RX ADMIN — MAGNESIUM SULFATE HEPTAHYDRATE 2 G: 40 INJECTION, SOLUTION INTRAVENOUS at 01:03

## 2025-01-01 RX ADMIN — POTASSIUM CHLORIDE 40 MEQ: 14.9 INJECTION, SOLUTION INTRAVENOUS at 06:03

## 2025-01-01 RX ADMIN — PROPOFOL 40 MCG/KG/MIN: 10 INJECTION, EMULSION INTRAVENOUS at 01:03

## 2025-01-05 ENCOUNTER — HOSPITAL ENCOUNTER (INPATIENT)
Facility: HOSPITAL | Age: 70
LOS: 2 days | Discharge: HOME | DRG: 281 | End: 2025-01-08
Attending: EMERGENCY MEDICINE | Admitting: STUDENT IN AN ORGANIZED HEALTH CARE EDUCATION/TRAINING PROGRAM
Payer: MEDICARE

## 2025-01-05 DIAGNOSIS — R07.9 CHEST PAIN: ICD-10-CM

## 2025-01-05 DIAGNOSIS — R74.8 CARDIAC ENZYMES ELEVATED: ICD-10-CM

## 2025-01-05 DIAGNOSIS — R07.9 CHEST PAIN, UNSPECIFIED TYPE: Primary | ICD-10-CM

## 2025-01-05 DIAGNOSIS — R00.0 TACHYCARDIA: ICD-10-CM

## 2025-01-05 DIAGNOSIS — R06.02 SHORTNESS OF BREATH: ICD-10-CM

## 2025-01-05 DIAGNOSIS — D64.9 ANEMIA, UNSPECIFIED TYPE: ICD-10-CM

## 2025-01-05 LAB
ALBUMIN SERPL-MCNC: 3 G/DL (ref 3.4–4.8)
ALBUMIN/GLOB SERPL: 0.7 RATIO (ref 1.1–2)
ALP SERPL-CCNC: 183 UNIT/L (ref 40–150)
ALT SERPL-CCNC: 53 UNIT/L (ref 0–55)
ANION GAP SERPL CALC-SCNC: 10 MEQ/L
AST SERPL-CCNC: 165 UNIT/L (ref 5–34)
BASOPHILS # BLD AUTO: 0.03 X10(3)/MCL
BASOPHILS NFR BLD AUTO: 0.4 %
BILIRUB SERPL-MCNC: 2.1 MG/DL
BNP BLD-MCNC: 78.7 PG/ML
BUN SERPL-MCNC: 8.6 MG/DL (ref 8.4–25.7)
CALCIUM SERPL-MCNC: 9 MG/DL (ref 8.8–10)
CHLORIDE SERPL-SCNC: 101 MMOL/L (ref 98–107)
CO2 SERPL-SCNC: 18 MMOL/L (ref 23–31)
CREAT SERPL-MCNC: 0.91 MG/DL (ref 0.72–1.25)
CREAT/UREA NIT SERPL: 9
EOSINOPHIL # BLD AUTO: 0.05 X10(3)/MCL (ref 0–0.9)
EOSINOPHIL NFR BLD AUTO: 0.7 %
ERYTHROCYTE [DISTWIDTH] IN BLOOD BY AUTOMATED COUNT: 18.5 % (ref 11.5–17)
GFR SERPLBLD CREATININE-BSD FMLA CKD-EPI: >60 ML/MIN/1.73/M2
GLOBULIN SER-MCNC: 4.1 GM/DL (ref 2.4–3.5)
GLUCOSE SERPL-MCNC: 117 MG/DL (ref 82–115)
HCT VFR BLD AUTO: 28.9 % (ref 42–52)
HGB BLD-MCNC: 10 G/DL (ref 14–18)
IMM GRANULOCYTES # BLD AUTO: 0.03 X10(3)/MCL (ref 0–0.04)
IMM GRANULOCYTES NFR BLD AUTO: 0.4 %
LYMPHOCYTES # BLD AUTO: 0.97 X10(3)/MCL (ref 0.6–4.6)
LYMPHOCYTES NFR BLD AUTO: 14.2 %
MCH RBC QN AUTO: 24.6 PG (ref 27–31)
MCHC RBC AUTO-ENTMCNC: 34.6 G/DL (ref 33–36)
MCV RBC AUTO: 71.2 FL (ref 80–94)
MONOCYTES # BLD AUTO: 0.73 X10(3)/MCL (ref 0.1–1.3)
MONOCYTES NFR BLD AUTO: 10.7 %
NEUTROPHILS # BLD AUTO: 5 X10(3)/MCL (ref 2.1–9.2)
NEUTROPHILS NFR BLD AUTO: 73.6 %
NRBC BLD AUTO-RTO: 0 %
PLATELET # BLD AUTO: 156 X10(3)/MCL (ref 130–400)
PLATELETS.RETICULATED NFR BLD AUTO: 12.9 % (ref 0.9–11.2)
PMV BLD AUTO: ABNORMAL FL
POTASSIUM SERPL-SCNC: 3.7 MMOL/L (ref 3.5–5.1)
PROT SERPL-MCNC: 7.1 GM/DL (ref 5.8–7.6)
RBC # BLD AUTO: 4.06 X10(6)/MCL (ref 4.7–6.1)
SODIUM SERPL-SCNC: 129 MMOL/L (ref 136–145)
TROPONIN I SERPL-MCNC: 0.13 NG/ML (ref 0–0.04)
TROPONIN I SERPL-MCNC: 0.14 NG/ML (ref 0–0.04)
TSH SERPL-ACNC: 1.44 UIU/ML (ref 0.35–4.94)
WBC # BLD AUTO: 6.81 X10(3)/MCL (ref 4.5–11.5)

## 2025-01-05 PROCEDURE — 25500020 PHARM REV CODE 255: Performed by: INTERNAL MEDICINE

## 2025-01-05 PROCEDURE — 85025 COMPLETE CBC W/AUTO DIFF WBC: CPT | Performed by: NURSE PRACTITIONER

## 2025-01-05 PROCEDURE — 93005 ELECTROCARDIOGRAM TRACING: CPT

## 2025-01-05 PROCEDURE — 99285 EMERGENCY DEPT VISIT HI MDM: CPT | Mod: 25

## 2025-01-05 PROCEDURE — 84443 ASSAY THYROID STIM HORMONE: CPT | Performed by: EMERGENCY MEDICINE

## 2025-01-05 PROCEDURE — 84484 ASSAY OF TROPONIN QUANT: CPT | Performed by: NURSE PRACTITIONER

## 2025-01-05 PROCEDURE — 96361 HYDRATE IV INFUSION ADD-ON: CPT

## 2025-01-05 PROCEDURE — 96374 THER/PROPH/DIAG INJ IV PUSH: CPT

## 2025-01-05 PROCEDURE — 80053 COMPREHEN METABOLIC PANEL: CPT | Performed by: NURSE PRACTITIONER

## 2025-01-05 PROCEDURE — 84484 ASSAY OF TROPONIN QUANT: CPT | Performed by: EMERGENCY MEDICINE

## 2025-01-05 PROCEDURE — 83880 ASSAY OF NATRIURETIC PEPTIDE: CPT | Performed by: NURSE PRACTITIONER

## 2025-01-05 PROCEDURE — 25000003 PHARM REV CODE 250: Performed by: EMERGENCY MEDICINE

## 2025-01-05 RX ORDER — METOPROLOL TARTRATE 1 MG/ML
5 INJECTION, SOLUTION INTRAVENOUS
Status: COMPLETED | OUTPATIENT
Start: 2025-01-05 | End: 2025-01-05

## 2025-01-05 RX ADMIN — SODIUM CHLORIDE 500 ML: 9 INJECTION, SOLUTION INTRAVENOUS at 05:01

## 2025-01-05 RX ADMIN — METOPROLOL TARTRATE 5 MG: 1 INJECTION, SOLUTION INTRAVENOUS at 04:01

## 2025-01-05 RX ADMIN — IOHEXOL 100 ML: 350 INJECTION, SOLUTION INTRAVENOUS at 11:01

## 2025-01-05 NOTE — FIRST PROVIDER EVALUATION
Medical screening examination initiated.  I have conducted a focused provider triage encounter, findings are as follows:    Brief history of present illness:  Patient states chest pain x a few days. States SOB. States that his heart rate has been elevated.     There were no vitals filed for this visit.    Pertinent physical exam:  Awake, alert, ambulatory      Brief workup plan:  Labs, EKG, Imaging    Preliminary workup initiated; this workup will be continued and followed by the physician or advanced practice provider that is assigned to the patient when roomed.

## 2025-01-05 NOTE — ED PROVIDER NOTES
Encounter Date: 1/5/2025    SCRIBE #1 NOTE: I, Meka Stuart, am scribing for, and in the presence of,  Raymon Milian MD. I have scribed the following portions of the note - the EKG reading. Other sections scribed: HPI, ROS, PE.       History     Chief Complaint   Patient presents with    Chest Pain     Pt co CP, SOB, high heart rate and HTN for past 3 days.      Patient is a 69 year old male with a history of hypertension that presents to ED for chest pain 2 days ago. Patient reports two episodes of sharp, left-sided chest pain on Friday, 01/03 with each episode lasting about 5 minutes. He also reports shortness of breath with exertion yesterday. Notes having to take several breaks while painting a house yesterday due to the shortness of breath. He reports tachycardia at home this morning. No chest pain yesterday or today. He denies palpitations, fever, and chills. He reports recent asymptomatic uncontrollable hypertension. He reports noncompliance with medications this morning. He denies smoking cigarettes. He denies a known cardiac history.     The history is provided by the patient and medical records.     Review of patient's allergies indicates:  No Known Allergies  Past Medical History:   Diagnosis Date    Pneumonia 12/19/2022    Verruca 12/19/2022     Past Surgical History:   Procedure Laterality Date    COLONOSCOPY  04/26/2022    Prosper Adkins MD    LT CATARACT W/IOL 1 STA PHACO (Left) (05/25/2017)      Lumpectomy (1996)      Replacement of Right Lens with Synthetic Substitute, Percutaneous Approach (12/08/2015)      RT CATARACT W/IOL 1 STA PHACO (Right) (12/08/2015)       Family History   Problem Relation Name Age of Onset    Alzheimer's disease Mother      Coronary artery disease Mother      Hypertension Father      Heart failure Father      No Known Problems Sister      No Known Problems Sister      No Known Problems Brother      No Known Problems Brother       Social History     Tobacco Use     Smoking status: Former     Current packs/day: 0.50     Types: Cigarettes    Smokeless tobacco: Never   Substance Use Topics    Alcohol use: Not Currently    Drug use: Never     Review of Systems   Constitutional:  Negative for chills, fatigue and fever.   HENT:  Negative for congestion and sore throat.    Eyes:  Negative for visual disturbance.   Respiratory:  Positive for shortness of breath. Negative for cough.    Cardiovascular:  Positive for chest pain. Negative for palpitations.        Tachycardia    Gastrointestinal:  Negative for abdominal pain, diarrhea, nausea and vomiting.   Genitourinary:  Negative for dysuria.   Musculoskeletal:  Negative for myalgias.   Skin:  Negative for rash.   Neurological:  Negative for weakness, numbness and headaches.       Physical Exam     Initial Vitals [01/05/25 1626]   BP Pulse Resp Temp SpO2   (!) 188/65 (!) 151 20 98 °F (36.7 °C) 100 %      MAP       --         Physical Exam    Nursing note and vitals reviewed.  Constitutional: No distress.   HENT:   Head: Normocephalic and atraumatic.   Right Ear: Tympanic membrane normal.   Left Ear: Tympanic membrane normal. Mouth/Throat: Oropharynx is clear and moist.   Eyes: Conjunctivae and EOM are normal. Pupils are equal, round, and reactive to light.   Neck: Trachea normal. Neck supple. Carotid bruit is not present. No JVD present.   Normal range of motion.  Cardiovascular:  Regular rhythm.   Tachycardia present.         No murmur heard.  Pulmonary/Chest: Breath sounds normal. No respiratory distress. He exhibits no tenderness.   Abdominal: Abdomen is soft. Bowel sounds are normal. He exhibits no distension. There is no abdominal tenderness.   Musculoskeletal:         General: Normal range of motion.      Cervical back: Normal range of motion and neck supple.      Lumbar back: Normal. No tenderness. Normal range of motion.     Neurological: He is alert and oriented to person, place, and time. He has normal strength. No cranial  nerve deficit or sensory deficit.   Psychiatric: He has a normal mood and affect. Judgment normal.         ED Course   Procedures  Labs Reviewed   COMPREHENSIVE METABOLIC PANEL - Abnormal       Result Value    Sodium 129 (*)     Potassium 3.7      Chloride 101      CO2 18 (*)     Glucose 117 (*)     Blood Urea Nitrogen 8.6      Creatinine 0.91      Calcium 9.0      Protein Total 7.1      Albumin 3.0 (*)     Globulin 4.1 (*)     Albumin/Globulin Ratio 0.7 (*)     Bilirubin Total 2.1 (*)      (*)     ALT 53       (*)     eGFR >60      Anion Gap 10.0      BUN/Creatinine Ratio 9     TROPONIN I - Abnormal    Troponin-I 0.145 (*)    CBC WITH DIFFERENTIAL - Abnormal    WBC 6.81      RBC 4.06 (*)     Hgb 10.0 (*)     Hct 28.9 (*)     MCV 71.2 (*)     MCH 24.6 (*)     MCHC 34.6      RDW 18.5 (*)     Platelet 156      MPV        Neut % 73.6      Lymph % 14.2      Mono % 10.7      Eos % 0.7      Basophil % 0.4      Lymph # 0.97      Neut # 5.00      Mono # 0.73      Eos # 0.05      Baso # 0.03      IG# 0.03      IG% 0.4      NRBC% 0.0      IPF 12.9 (*)    TROPONIN I - Abnormal    Troponin-I 0.133 (*)    B-TYPE NATRIURETIC PEPTIDE - Normal    Natriuretic Peptide 78.7     TSH - Normal    TSH 1.438     CBC W/ AUTO DIFFERENTIAL    Narrative:     The following orders were created for panel order CBC Auto Differential.  Procedure                               Abnormality         Status                     ---------                               -----------         ------                     CBC with Differential[[id number]]       Abnormal            Final result                 Please view results for these tests on the individual orders.     EKG Readings: (Independently Interpreted)   Initial Reading: No STEMI. Rhythm: Sinus Tachycardia. Heart Rate: 128. Ectopy: No Ectopy. Conduction: Normal. ST Segments: Normal ST Segments. T Waves: Normal. Axis: Normal.   1627. Left Ventricular Hypertrophy with strain.         Imaging Results              CTA Chest Non-Coronary (PE Studies) (Preliminary result)  Result time 01/05/25 23:26:20      Preliminary result by Alverto Gallegos MD (01/05/25 23:26:20)                   Narrative:    START OF REPORT:  Technique: CT Scan of the chest was performed with intravenous contrast with direct axial images as well as sagittal and coronal reconstruction images pulmonary embolus protocol.    Dosage Information: Automated Exposure Control was utilized 316.57 mGy.cm.    Comparison: None.    Clinical History: Patient is a 69 year old male with a history of hypertension that presents to ED for chest pain 2 days ago. Patient reports two episodes of sharp, left-sided chest pain on Friday, 01/03 with each episode lasting about 5 minutes. He also reports shortness of breath with exertion yesterday. Notes having to take several breaks while painting a house yesterday due to the shortness of breath. He reports tachycardia at home this morning.    Findings:  Soft Tissues: Unremarkable.  Lines and Tubes: None.  Neck: The visualized soft tissues of the neck appear unremarkable.  Mediastinum: The mediastinal structures are within normal limits.  Heart: The heart size is within normal limits. Mild cardiomegaly is seen. Moderate coronary artery calcification is seen.  Aorta: No aortic dissection or aneurysm is seen. Moderate aortic calcification is seen in the thoracic aorta.  Pulmonary Arteries: No filling defects are seen in the pulmonary arteries to suggest pulmonary embolus.  Lungs: No acute focal infiltrate or consolidation is seen.  Pleura: No effusions or pneumothorax are identified. There is pleural calcification along the right lower anterolateral chest wall.  Bony Structures:  Spine: Moderate spondylolytic changes are seen in the thoracic spine.  Ribs: The ribs appear unremarkable.  Abdomen: The visualized upper abdominal organs appear unremarkable.      Impression:  1. No filling defects are  seen in the pulmonary arteries to suggest pulmonary embolus.  2. No acute focal infiltrate or consolidation is seen.  3. Details and other findings as discussed above.                                         X-Ray Chest 1 View (Final result)  Result time 01/05/25 17:09:23      Final result by Zafar Dillon MD (01/05/25 17:09:23)                   Impression:      No acute cardiopulmonary process identified.      Electronically signed by: Zafar Dillon  Date:    01/05/2025  Time:    17:09               Narrative:    EXAMINATION:  XR CHEST 1 VIEW    CLINICAL HISTORY:  Shortness of breath    TECHNIQUE:  One view    COMPARISON:  May 24, 2018.    FINDINGS:  Cardiopericardial silhouette is within normal limits.  Right lower chronic pleural scarring with calcification is with similar appearance.  No acute dense focal or segmental consolidation, congestive process, pleural effusions or pneumothorax.                                       Medications   metoprolol injection 5 mg (5 mg Intravenous Given 1/5/25 1654)   sodium chloride 0.9% bolus 500 mL 500 mL (0 mLs Intravenous Stopped 1/5/25 1837)   iohexoL (OMNIPAQUE 350) injection 100 mL (100 mLs Intravenous Given 1/5/25 2303)     Medical Decision Making  Differential diagnosis includes, but is not limited to, hypertensive emergency, NSTEMI, STEMI, cardiac dysrhythmia, and CHF.     Amount and/or Complexity of Data Reviewed  Labs: ordered. Decision-making details documented in ED Course.  Radiology: ordered. Decision-making details documented in ED Course.     Details: CTA shows no PE  ECG/medicine tests: ordered and independent interpretation performed.     Details: 1627. Initial Reading: No STEMI. Rhythm: Sinus Tachycardia. Heart Rate: 128. Ectopy: No Ectopy. Conduction: Normal. ST Segments: Normal ST Segments. T Waves: Normal. Axis: Normal. Left Ventricular Hypertrophy with strain.   Discussion of management or test interpretation with external provider(s): Discussed with  cardiology who will see in consultation.  Recommends CTA to rule out PE.  Patient without risk factors, but with the unexplained tachycardia reasonable suggestion.    Discussed with hospitalist who accepts for admission    Risk  Decision regarding hospitalization.            Scribe Attestation:   Scribe #1: I performed the above scribed service and the documentation accurately describes the services I performed. I attest to the accuracy of the note.    Attending Attestation:           Physician Attestation for Scribe:  Physician Attestation Statement for Scribe #1: I, Raymon Milian MD, reviewed documentation, as scribed by Meka Stuart in my presence, and it is both accurate and complete.             ED Course as of 01/05/25 2336   Sun Jan 05, 2025 2100 Awaiting results of 2nd troponin  Hemoglobin is 10, hematocrit 28.9   CO2 is at 18 but no anion gap [MP]   2202 Rectal exam scant stool Hemoccult negative   Repeat troponin 0.133 slightly lower than 1st which was 0.145 [MP]   2209 Discussed with the patient admission in the hospital for evaluation of elevated cardiac enzymes, tachycardia along with new anemia.  Patient is refusing to stay.  Had a long conversation with the patient explain the risks and benefits, specifically risk of death, cardiac arrest,,, long-term disability and heart failure.  Regardless patient is going to leave against medical advice.  I encouraged him to return at any time if he changes his mind and certainly at his 1st available opportunity [MP]   2239 Patient's wife presented and has convinced the patient to stay and not leave against medical advice [MP]   2242 Paged [facility] [ED]   2325 Spoke with Escobar with [facility], recommends obtain a CTA just to rule out a PE.  Otherwise aspirin, and will see in consultation [MP]   2334 CTA unremarkable, will admit to hospitalist at this time [MP]      ED Course User Index  [ED] Meka Stuart  [MP] Raymon Milian MD                           Clinical  Impression:  Final diagnoses:  [R06.02] Shortness of breath  [R07.9] Chest pain, unspecified type (Primary)  [R74.8] Cardiac enzymes elevated  [R00.0] Tachycardia  [D64.9] Anemia, unspecified type          ED Disposition Condition    Observation Stable                Raymon Milian MD  01/05/25 2213       Raymon Milian MD  01/05/25 2240       aRymon Milian MD  01/05/25 [id number]

## 2025-01-05 NOTE — Clinical Note
Diagnosis: Chest pain, unspecified type [[id number]]   Future Attending Provider: MARY JOSUE [[id number]]   Admit to which facility:: [facility] [[id number]]

## 2025-01-06 LAB
ALBUMIN SERPL-MCNC: 2.7 G/DL (ref 3.4–4.8)
ALBUMIN/GLOB SERPL: 0.7 RATIO (ref 1.1–2)
ALP SERPL-CCNC: 155 UNIT/L (ref 40–150)
ALT SERPL-CCNC: 49 UNIT/L (ref 0–55)
ANION GAP SERPL CALC-SCNC: 7 MEQ/L
ASCENDING AORTA: 3.3 CM
AST SERPL-CCNC: 138 UNIT/L (ref 5–34)
AV INDEX (PROSTH): 1
AV MEAN GRADIENT: 5 MMHG
AV PEAK GRADIENT: 10.2 MMHG
AV VALVE AREA BY VELOCITY RATIO: 3 CM²
AV VALVE AREA: 3.5 CM²
AV VELOCITY RATIO: 0.88
BASOPHILS # BLD AUTO: 0.04 X10(3)/MCL
BASOPHILS NFR BLD AUTO: 0.7 %
BILIRUB SERPL-MCNC: 1.9 MG/DL
BSA FOR ECHO PROCEDURE: 2.29 M2
BUN SERPL-MCNC: 8.4 MG/DL (ref 8.4–25.7)
CALCIUM SERPL-MCNC: 8.7 MG/DL (ref 8.8–10)
CHLORIDE SERPL-SCNC: 102 MMOL/L (ref 98–107)
CO2 SERPL-SCNC: 21 MMOL/L (ref 23–31)
CREAT SERPL-MCNC: 0.79 MG/DL (ref 0.72–1.25)
CREAT/UREA NIT SERPL: 11
CV ECHO LV RWT: 0.58 CM
DOP CALC AO PEAK VEL: 1.6 M/S
DOP CALC AO VTI: 28.3 CM
DOP CALC LVOT AREA: 3.5 CM2
DOP CALC LVOT DIAMETER: 2.1 CM
DOP CALC LVOT PEAK VEL: 1.4 M/S
DOP CALC LVOT STROKE VOLUME: 98.3 CM3
DOP CALC MV VTI: 17.6 CM
DOP CALCLVOT PEAK VEL VTI: 28.4 CM
E WAVE DECELERATION TIME: 198 MSEC
E/A RATIO: 0.99
E/E' RATIO: 9.89 M/S
ECHO LV POSTERIOR WALL: 1.1 CM (ref 0.6–1.1)
EOSINOPHIL # BLD AUTO: 0.07 X10(3)/MCL (ref 0–0.9)
EOSINOPHIL NFR BLD AUTO: 1.2 %
ERYTHROCYTE [DISTWIDTH] IN BLOOD BY AUTOMATED COUNT: 18.4 % (ref 11.5–17)
FERRITIN SERPL-MCNC: 70.94 NG/ML (ref 21.81–274.66)
FOLATE SERPL-MCNC: 5.1 NG/ML (ref 7–31.4)
FRACTIONAL SHORTENING: 26.3 % (ref 28–44)
GFR SERPLBLD CREATININE-BSD FMLA CKD-EPI: >60 ML/MIN/1.73/M2
GLOBULIN SER-MCNC: 3.9 GM/DL (ref 2.4–3.5)
GLUCOSE SERPL-MCNC: 112 MG/DL (ref 82–115)
HAV IGM SERPL QL IA: NONREACTIVE
HBV CORE IGM SERPL QL IA: NONREACTIVE
HBV SURFACE AG SERPL QL IA: NONREACTIVE
HCT VFR BLD AUTO: 25.4 % (ref 42–52)
HCV AB SERPL QL IA: NONREACTIVE
HGB BLD-MCNC: 8.9 G/DL (ref 14–18)
IMM GRANULOCYTES # BLD AUTO: 0.01 X10(3)/MCL (ref 0–0.04)
IMM GRANULOCYTES NFR BLD AUTO: 0.2 %
INTERVENTRICULAR SEPTUM: 1.1 CM (ref 0.6–1.1)
IRON SATN MFR SERPL: 12 % (ref 20–50)
IRON SERPL-MCNC: 30 UG/DL (ref 65–175)
LEFT ATRIUM AREA SYSTOLIC (APICAL 2 CHAMBER): 20.2 CM2
LEFT ATRIUM AREA SYSTOLIC (APICAL 4 CHAMBER): 25.7 CM2
LEFT ATRIUM SIZE: 3.9 CM
LEFT ATRIUM VOLUME INDEX MOD: 35.6 ML/M2
LEFT ATRIUM VOLUME MOD: 79.8 ML
LEFT INTERNAL DIMENSION IN SYSTOLE: 2.8 CM (ref 2.1–4)
LEFT VENTRICLE DIASTOLIC VOLUME INDEX: 27.68 ML/M2
LEFT VENTRICLE DIASTOLIC VOLUME: 62 ML
LEFT VENTRICLE END SYSTOLIC VOLUME APICAL 2 CHAMBER: 61.8 ML
LEFT VENTRICLE END SYSTOLIC VOLUME APICAL 4 CHAMBER: 89.8 ML
LEFT VENTRICLE MASS INDEX: 60.1 G/M2
LEFT VENTRICLE SYSTOLIC VOLUME INDEX: 13.2 ML/M2
LEFT VENTRICLE SYSTOLIC VOLUME: 29.6 ML
LEFT VENTRICULAR INTERNAL DIMENSION IN DIASTOLE: 3.8 CM (ref 3.5–6)
LEFT VENTRICULAR MASS: 134.7 G
LV LATERAL E/E' RATIO: 9.4 M/S
LV SEPTAL E/E' RATIO: 10.44 M/S
LVED V (TEICH): 62 ML
LVES V (TEICH): 29.6 ML
LVOT MG: 4 MMHG
LVOT MV: 0.98 CM/S
LYMPHOCYTES # BLD AUTO: 0.5 X10(3)/MCL (ref 0.6–4.6)
LYMPHOCYTES NFR BLD AUTO: 8.9 %
MAGNESIUM SERPL-MCNC: 1.6 MG/DL (ref 1.6–2.6)
MCH RBC QN AUTO: 25.1 PG (ref 27–31)
MCHC RBC AUTO-ENTMCNC: 35 G/DL (ref 33–36)
MCV RBC AUTO: 71.5 FL (ref 80–94)
MONOCYTES # BLD AUTO: 0.65 X10(3)/MCL (ref 0.1–1.3)
MONOCYTES NFR BLD AUTO: 11.5 %
MV MEAN GRADIENT: 5 MMHG
MV PEAK A VEL: 0.95 M/S
MV PEAK E VEL: 0.94 M/S
MV PEAK GRADIENT: 9 MMHG
MV VALVE AREA BY CONTINUITY EQUATION: 5.59 CM2
NEUTROPHILS # BLD AUTO: 4.36 X10(3)/MCL (ref 2.1–9.2)
NEUTROPHILS NFR BLD AUTO: 77.5 %
NRBC BLD AUTO-RTO: 0 %
OHS CV RV/LV RATIO: 1 CM
OHS QRS DURATION: 94 MS
OHS QTC CALCULATION: 429 MS
PHOSPHATE SERPL-MCNC: 3.4 MG/DL (ref 2.3–4.7)
PLATELET # BLD AUTO: 116 X10(3)/MCL (ref 130–400)
PLATELETS.RETICULATED NFR BLD AUTO: 10.9 % (ref 0.9–11.2)
PMV BLD AUTO: ABNORMAL FL
POTASSIUM SERPL-SCNC: 3.5 MMOL/L (ref 3.5–5.1)
PROT SERPL-MCNC: 6.6 GM/DL (ref 5.8–7.6)
RA WIDTH: 4.4 CM
RBC # BLD AUTO: 3.55 X10(6)/MCL (ref 4.7–6.1)
RIGHT VENTRICLE DIASTOLIC BASEL DIMENSION: 3.8 CM
RIGHT VENTRICULAR END-DIASTOLIC DIMENSION: 3.8 CM
SODIUM SERPL-SCNC: 130 MMOL/L (ref 136–145)
TDI LATERAL: 0.1 M/S
TDI SEPTAL: 0.09 M/S
TDI: 0.1 M/S
TIBC SERPL-MCNC: 228 UG/DL (ref 60–240)
TIBC SERPL-MCNC: 258 UG/DL (ref 250–450)
TRANSFERRIN SERPL-MCNC: 239 MG/DL (ref 163–344)
TRICUSPID ANNULAR PLANE SYSTOLIC EXCURSION: 2.43 CM
TROPONIN I SERPL-MCNC: 0.08 NG/ML (ref 0–0.04)
TROPONIN I SERPL-MCNC: 0.09 NG/ML (ref 0–0.04)
TROPONIN I SERPL-MCNC: 0.11 NG/ML (ref 0–0.04)
VIT B12 SERPL-MCNC: 1275 PG/ML (ref 213–816)
WBC # BLD AUTO: 5.63 X10(3)/MCL (ref 4.5–11.5)
Z-SCORE OF LEFT VENTRICULAR DIMENSION IN END DIASTOLE: -7.46
Z-SCORE OF LEFT VENTRICULAR DIMENSION IN END SYSTOLE: -4.35

## 2025-01-06 PROCEDURE — 80074 ACUTE HEPATITIS PANEL: CPT | Performed by: NURSE PRACTITIONER

## 2025-01-06 PROCEDURE — 82746 ASSAY OF FOLIC ACID SERUM: CPT | Performed by: NURSE PRACTITIONER

## 2025-01-06 PROCEDURE — 82728 ASSAY OF FERRITIN: CPT | Performed by: NURSE PRACTITIONER

## 2025-01-06 PROCEDURE — 96361 HYDRATE IV INFUSION ADD-ON: CPT

## 2025-01-06 PROCEDURE — 84100 ASSAY OF PHOSPHORUS: CPT | Performed by: NURSE PRACTITIONER

## 2025-01-06 PROCEDURE — 80053 COMPREHEN METABOLIC PANEL: CPT | Performed by: NURSE PRACTITIONER

## 2025-01-06 PROCEDURE — 25000003 PHARM REV CODE 250: Performed by: NURSE PRACTITIONER

## 2025-01-06 PROCEDURE — 83735 ASSAY OF MAGNESIUM: CPT | Performed by: NURSE PRACTITIONER

## 2025-01-06 PROCEDURE — 96376 TX/PRO/DX INJ SAME DRUG ADON: CPT

## 2025-01-06 PROCEDURE — 36415 COLL VENOUS BLD VENIPUNCTURE: CPT | Performed by: NURSE PRACTITIONER

## 2025-01-06 PROCEDURE — 83550 IRON BINDING TEST: CPT | Performed by: NURSE PRACTITIONER

## 2025-01-06 PROCEDURE — 25000003 PHARM REV CODE 250

## 2025-01-06 PROCEDURE — 84484 ASSAY OF TROPONIN QUANT: CPT | Performed by: NURSE PRACTITIONER

## 2025-01-06 PROCEDURE — 85025 COMPLETE CBC W/AUTO DIFF WBC: CPT | Performed by: NURSE PRACTITIONER

## 2025-01-06 PROCEDURE — 25000003 PHARM REV CODE 250: Performed by: STUDENT IN AN ORGANIZED HEALTH CARE EDUCATION/TRAINING PROGRAM

## 2025-01-06 PROCEDURE — 82607 VITAMIN B-12: CPT | Performed by: NURSE PRACTITIONER

## 2025-01-06 PROCEDURE — 21400001 HC TELEMETRY ROOM

## 2025-01-06 RX ORDER — SODIUM CHLORIDE 9 MG/ML
INJECTION, SOLUTION INTRAVENOUS CONTINUOUS
Status: ACTIVE | OUTPATIENT
Start: 2025-01-06 | End: 2025-01-06

## 2025-01-06 RX ORDER — ACETAMINOPHEN 325 MG/1
650 TABLET ORAL EVERY 6 HOURS PRN
Status: DISCONTINUED | OUTPATIENT
Start: 2025-01-06 | End: 2025-01-08 | Stop reason: HOSPADM

## 2025-01-06 RX ORDER — METOPROLOL TARTRATE 1 MG/ML
5 INJECTION, SOLUTION INTRAVENOUS ONCE
Status: COMPLETED | OUTPATIENT
Start: 2025-01-06 | End: 2025-01-06

## 2025-01-06 RX ORDER — ASPIRIN 81 MG/1
81 TABLET ORAL DAILY
Status: DISCONTINUED | OUTPATIENT
Start: 2025-01-07 | End: 2025-01-08 | Stop reason: HOSPADM

## 2025-01-06 RX ORDER — POLYETHYLENE GLYCOL 3350 17 G/17G
17 POWDER, FOR SOLUTION ORAL 2 TIMES DAILY PRN
Status: DISCONTINUED | OUTPATIENT
Start: 2025-01-06 | End: 2025-01-08 | Stop reason: HOSPADM

## 2025-01-06 RX ORDER — ALUMINUM HYDROXIDE, MAGNESIUM HYDROXIDE, AND SIMETHICONE 1200; 120; 1200 MG/30ML; MG/30ML; MG/30ML
30 SUSPENSION ORAL 4 TIMES DAILY PRN
Status: DISCONTINUED | OUTPATIENT
Start: 2025-01-06 | End: 2025-01-08 | Stop reason: HOSPADM

## 2025-01-06 RX ORDER — PROCHLORPERAZINE EDISYLATE 5 MG/ML
5 INJECTION INTRAMUSCULAR; INTRAVENOUS EVERY 6 HOURS PRN
Status: DISCONTINUED | OUTPATIENT
Start: 2025-01-06 | End: 2025-01-08 | Stop reason: HOSPADM

## 2025-01-06 RX ORDER — ONDANSETRON HYDROCHLORIDE 2 MG/ML
4 INJECTION, SOLUTION INTRAVENOUS EVERY 4 HOURS PRN
Status: DISCONTINUED | OUTPATIENT
Start: 2025-01-06 | End: 2025-01-08 | Stop reason: HOSPADM

## 2025-01-06 RX ORDER — SIMETHICONE 80 MG
1 TABLET,CHEWABLE ORAL 4 TIMES DAILY PRN
Status: DISCONTINUED | OUTPATIENT
Start: 2025-01-06 | End: 2025-01-08 | Stop reason: HOSPADM

## 2025-01-06 RX ORDER — VALSARTAN 80 MG/1
320 TABLET ORAL DAILY
Status: DISCONTINUED | OUTPATIENT
Start: 2025-01-07 | End: 2025-01-08 | Stop reason: HOSPADM

## 2025-01-06 RX ORDER — NALOXONE HCL 0.4 MG/ML
0.02 VIAL (ML) INJECTION
Status: DISCONTINUED | OUTPATIENT
Start: 2025-01-06 | End: 2025-01-08 | Stop reason: HOSPADM

## 2025-01-06 RX ORDER — ENOXAPARIN SODIUM 100 MG/ML
40 INJECTION SUBCUTANEOUS EVERY 24 HOURS
Status: DISCONTINUED | OUTPATIENT
Start: 2025-01-06 | End: 2025-01-06

## 2025-01-06 RX ORDER — AMLODIPINE BESYLATE 5 MG/1
10 TABLET ORAL DAILY
Status: DISCONTINUED | OUTPATIENT
Start: 2025-01-06 | End: 2025-01-08 | Stop reason: HOSPADM

## 2025-01-06 RX ORDER — METOPROLOL SUCCINATE 50 MG/1
50 TABLET, EXTENDED RELEASE ORAL DAILY
Status: DISCONTINUED | OUTPATIENT
Start: 2025-01-07 | End: 2025-01-07

## 2025-01-06 RX ORDER — TALC
6 POWDER (GRAM) TOPICAL NIGHTLY PRN
Status: DISCONTINUED | OUTPATIENT
Start: 2025-01-06 | End: 2025-01-08 | Stop reason: HOSPADM

## 2025-01-06 RX ORDER — FOLIC ACID 1 MG/1
1 TABLET ORAL DAILY
Status: DISCONTINUED | OUTPATIENT
Start: 2025-01-06 | End: 2025-01-08 | Stop reason: HOSPADM

## 2025-01-06 RX ORDER — CHLORDIAZEPOXIDE HYDROCHLORIDE 25 MG/1
25 CAPSULE, GELATIN COATED ORAL 4 TIMES DAILY PRN
Status: DISCONTINUED | OUTPATIENT
Start: 2025-01-06 | End: 2025-01-08 | Stop reason: HOSPADM

## 2025-01-06 RX ORDER — THIAMINE HCL 100 MG
100 TABLET ORAL DAILY
Status: DISCONTINUED | OUTPATIENT
Start: 2025-01-06 | End: 2025-01-08 | Stop reason: HOSPADM

## 2025-01-06 RX ORDER — ASPIRIN 81 MG/1
81 TABLET ORAL DAILY
Status: DISCONTINUED | OUTPATIENT
Start: 2025-01-06 | End: 2025-01-06

## 2025-01-06 RX ADMIN — THIAMINE HCL TAB 100 MG 100 MG: 100 TAB at 01:01

## 2025-01-06 RX ADMIN — FOLIC ACID 1 MG: 1 TABLET ORAL at 01:01

## 2025-01-06 RX ADMIN — ASPIRIN 81 MG: 81 TABLET, COATED ORAL at 10:01

## 2025-01-06 RX ADMIN — THERA TABS 1 TABLET: TAB at 01:01

## 2025-01-06 RX ADMIN — SODIUM CHLORIDE: 9 INJECTION, SOLUTION INTRAVENOUS at 02:01

## 2025-01-06 RX ADMIN — METOPROLOL TARTRATE 5 MG: 1 INJECTION, SOLUTION INTRAVENOUS at 12:01

## 2025-01-06 RX ADMIN — AMLODIPINE BESYLATE 10 MG: 5 TABLET ORAL at 11:01

## 2025-01-06 NOTE — DISCHARGE INSTRUCTIONS
Discharge instructions reviewed with patient and family member at the bedside. All questions answered and understanding was verbalized. Patient appears to be in stable condition and is ready for discharge at this time.

## 2025-01-06 NOTE — CONSULTS
Inpatient consult to Cardiology  Consult performed by: Bethany Leary FNP  Consult ordered by: Raymon Milian MD  Reason for consult: CP, NSTEMI        [facility] *    Cardiology  Consult Note    Patient Name: Raymon Causey  MRN: [id number]  Admission Date: 1/5/2025  Hospital Length of Stay: 0 days  Code Status: Full Code   Attending Provider: Chidi Hamilton MD   Consulting Provider: DEVIN Caldwell  Primary Care Physician: BRETT Diop Jr., MD  Principal Problem:<principal problem not specified>    Patient information was obtained from patient, past medical records, and ER records.     Subjective:     Reason for Consult: CP, NSTEMI    HPI: Mr. Causey is a 69 year old male who is known to [facility], Dr. Alas. He presents to the ER with complaints of two episodes of sharp, left sided CP that began on 1.3.25 with each episode lasting 5 minutes. He reports associated symptoms of SOB on exertion and has had to take several breaks while painting a house due to SOB. He also reports tachycardia at home and HTN but denies palpitations, fever, or chills. He reports that he was noncompliant with his medications that morning. Significant labs include H&H 10/28.9 -> 8.9/25.4, Plt 116, Na 130, trop peak 0.145. On arrival, he was found to be tachycardic & hypertensive (188/65). An EKG was obtained and demonstrated Sinus Tachycardia w/ LVH. [facility] has been consulted to further evaluate the patient's CP & elevated troponin.       PMH: HTN, HLD, glaucoma, Vit D Deficiency, osteoarthritis   PSH: neck surgery, right cataract surgery   Family History: Father - HTN, CA; Mother - HTN, DM 2, Alzheimer's disease  Social History: Former tobacco use. Denies alcohol or illicit drug use.     Previous Cardiac Diagnostics:   MPI (9.4.14):  This is a normal perfusion study, no perfusion defects noted. There is no evidence of ischemia.   This scan is suggestive of low risk for future cardiovascular events.   The  left ventricular cavity is noted to be normal on the stress study. The left ventricular ejection fraction was calculated to be 58% and left ventricular global function is normal.   The study quality is good.   Stress ECG is discordant with perfusion study; need clinical correlation.     TTE (9.4.14):  The study quality is below average.   The left ventricle is normal in size. Global left ventricular systolic function is normal. The left ventricular ejection fraction is 65%. Left ventricular diastolic function is normal. Noted left ventricular hypertrophy. It is mild.   Mild (1+) pulmonic regurgitation.      Review of patient's allergies indicates:  No Known Allergies  No current facility-administered medications on file prior to encounter.     Current Outpatient Medications on File Prior to Encounter   Medication Sig    amLODIPine (NORVASC) 10 MG tablet Take 1 tablet (10 mg total) by mouth once daily.    aspirin (ECOTRIN) 81 MG EC tablet Take 81 mg by mouth once daily.    cyclobenzaprine (FLEXERIL) 5 MG tablet Take 1 tablet (5 mg total) by mouth 3 (three) times daily as needed for Muscle spasms.    meloxicam (MOBIC) 15 MG tablet Take 1 tablet (15 mg total) by mouth daily as needed for Pain.    metoprolol succinate (TOPROL-XL) 50 MG 24 hr tablet Take 1 tablet (50 mg total) by mouth once daily.    rosuvastatin (CRESTOR) 20 MG tablet Take 1 tablet (20 mg total) by mouth once daily.    valsartan (DIOVAN) 320 MG tablet Take 1 tablet (320 mg total) by mouth once daily.       Review of Systems   Respiratory:  Negative for shortness of breath.    Cardiovascular:  Negative for chest pain and palpitations.   Neurological:  Positive for tremors.   All other systems reviewed and are negative.      Objective:     Vital Signs (Most Recent):  Temp: 98 °F (36.7 °C) (01/05/25 1626)  Pulse: 76 (01/06/25 0720)  Resp: 16 (01/06/25 0720)  BP: (!) 114/56 (01/06/25 0700)  SpO2: 99 % (01/06/25 0720) Vital Signs (24h Range):  Temp:  [98 °F  "(36.7 °C)] 98 °F (36.7 °C)  Pulse:  [] 76  Resp:  [14-32] 16  SpO2:  [95 %-100 %] 99 %  BP: ()/(43-97) 114/56   Weight: 106.6 kg (235 lb)  Body mass index is 33.72 kg/m².  SpO2: 99 %       Intake/Output Summary (Last 24 hours) at 1/6/2025 0800  Last data filed at 1/5/2025 1837  Gross per 24 hour   Intake 500 ml   Output --   Net 500 ml     Lines/Drains/Airways       Peripheral Intravenous Line  Duration                  Peripheral IV - Single Lumen 01/05/25 1640 20 G Anterior;Proximal;Right Forearm <1 day                  Significant Labs:   Chemistries:   Recent Labs   Lab 01/05/25 1642 01/05/25 2106 01/06/25 0317   *  --  130*   K 3.7  --  3.5     --  102   CO2 18*  --  21*   BUN 8.6  --  8.4   CREATININE 0.91  --  0.79   CALCIUM 9.0  --  8.7*   BILITOT 2.1*  --  1.9*   ALKPHOS 183*  --  155*   ALT 53  --  49   *  --  138*   GLUCOSE 117*  --  112   MG  --   --  1.60   PHOS  --   --  3.4   TROPONINI 0.145* 0.133* 0.106*        CBC/Anemia Labs: Coags:    Recent Labs   Lab 01/05/25 1642 01/06/25 0317   WBC 6.81 5.63   HGB 10.0* 8.9*   HCT 28.9* 25.4*    116*   MCV 71.2* 71.5*   RDW 18.5* 18.4*    No results for input(s): "PT", "INR", "APTT" in the last 168 hours.     Significant Imaging:  Imaging Results              US Abdomen Limited (In process)                      CTA Chest Non-Coronary (PE Studies) (Final result)  Result time 01/06/25 06:08:07      Final result by Escobar Leonard MD (01/06/25 06:08:07)                   Impression:      No evidence of pulmonary embolism through the level of the subsegmental pulmonary arteries.  There is no acute abnormality of the chest.    [facility] concordance      Electronically signed by: Escobar Leonard MD  Date:    01/06/2025  Time:    06:08               Narrative:    EXAMINATION:  CTA CHEST NON CORONARY (PE STUDIES)    CLINICAL HISTORY:  Pulmonary embolism (PE) suspected, unknown D-dimer;    TECHNIQUE:  Axial images of the chest were " obtained with IV contrast administration.  Coronal and sagittal reconstructions were provided.  Three dimensional and MIP images were obtained and evaluated.  Total DLP was 317 mGy-cm. Dose lowering technique and automated exposure control were utilized for this exam.    COMPARISON:  Chest radiograph 01/05/2025.    FINDINGS:  There is no filling defect within the pulmonary arteries through the level of the subsegmental pulmonary arteries.  There is no CT evidence of right heart strain.    The airway is widely patent.  There is no mediastinal or hilar lymphadenopathy.  The heart is not enlarged.    The calcified pleural plaques in the anterior left hemithorax are unchanged.  The lung parenchyma is otherwise clear.  There is no pulmonary nodule or mass.  There is no pleural effusion.  There is no ground-glass opacity.    The upper abdomen demonstrates no acute abnormality.  There is no lytic or blastic osseous lesion.                        Preliminary result by Alverto Gallegos MD (01/05/25 23:26:20)                   Impression:    1. No filling defects are seen in the pulmonary arteries to suggest pulmonary embolus.  2. No acute focal infiltrate or consolidation is seen.  3. Details and other findings as discussed above.               Narrative:    START OF REPORT:  Technique: CT Scan of the chest was performed with intravenous contrast with direct axial images as well as sagittal and coronal reconstruction images pulmonary embolus protocol.    Dosage Information: Automated Exposure Control was utilized 316.57 mGy.cm.    Comparison: None.    Clinical History: Patient is a 69 year old male with a history of hypertension that presents to ED for chest pain 2 days ago. Patient reports two episodes of sharp, left-sided chest pain on Friday, 01/03 with each episode lasting about 5 minutes. He also reports shortness of breath with exertion yesterday. Notes having to take several breaks while painting a house yesterday due  to the shortness of breath. He reports tachycardia at home this morning.    Findings:  Soft Tissues: Unremarkable.  Lines and Tubes: None.  Neck: The visualized soft tissues of the neck appear unremarkable.  Mediastinum: The mediastinal structures are within normal limits.  Heart: The heart size is within normal limits. Mild cardiomegaly is seen. Moderate coronary artery calcification is seen.  Aorta: No aortic dissection or aneurysm is seen. Moderate aortic calcification is seen in the thoracic aorta.  Pulmonary Arteries: No filling defects are seen in the pulmonary arteries to suggest pulmonary embolus.  Lungs: No acute focal infiltrate or consolidation is seen.  Pleura: No effusions or pneumothorax are identified. There is pleural calcification along the right lower anterolateral chest wall.  Bony Structures:  Spine: Moderate spondylolytic changes are seen in the thoracic spine.  Ribs: The ribs appear unremarkable.  Abdomen: The visualized upper abdominal organs appear unremarkable.                                         X-Ray Chest 1 View (Final result)  Result time 01/05/25 17:09:23      Final result by Zafar Dillon MD (01/05/25 17:09:23)                   Impression:      No acute cardiopulmonary process identified.      Electronically signed by: Zafar Dillon  Date:    01/05/2025  Time:    17:09               Narrative:    EXAMINATION:  XR CHEST 1 VIEW    CLINICAL HISTORY:  Shortness of breath    TECHNIQUE:  One view    COMPARISON:  May 24, 2018.    FINDINGS:  Cardiopericardial silhouette is within normal limits.  Right lower chronic pleural scarring with calcification is with similar appearance.  No acute dense focal or segmental consolidation, congestive process, pleural effusions or pneumothorax.                                    EKG:       Telemetry:  ST    Physical Exam  HENT:      Head: Normocephalic.      Nose: Nose normal.      Mouth/Throat:      Mouth: Mucous membranes are dry.   Eyes:       Extraocular Movements: Extraocular movements intact.   Cardiovascular:      Rate and Rhythm: Regular rhythm. Tachycardia present.      Pulses: Normal pulses.   Pulmonary:      Effort: Pulmonary effort is normal.      Breath sounds: Normal breath sounds.   Abdominal:      Palpations: Abdomen is soft.   Skin:     General: Skin is warm.   Neurological:      Mental Status: He is alert and oriented to person, place, and time.      Comments: Tremors noted   Psychiatric:         Behavior: Behavior normal.       Home Medications:   No current facility-administered medications on file prior to encounter.     Current Outpatient Medications on File Prior to Encounter   Medication Sig Dispense Refill    amLODIPine (NORVASC) 10 MG tablet Take 1 tablet (10 mg total) by mouth once daily. 90 tablet 2    aspirin (ECOTRIN) 81 MG EC tablet Take 81 mg by mouth once daily.      cyclobenzaprine (FLEXERIL) 5 MG tablet Take 1 tablet (5 mg total) by mouth 3 (three) times daily as needed for Muscle spasms. 20 tablet 1    meloxicam (MOBIC) 15 MG tablet Take 1 tablet (15 mg total) by mouth daily as needed for Pain. 90 tablet 2    metoprolol succinate (TOPROL-XL) 50 MG 24 hr tablet Take 1 tablet (50 mg total) by mouth once daily. 90 tablet 2    rosuvastatin (CRESTOR) 20 MG tablet Take 1 tablet (20 mg total) by mouth once daily. 90 tablet 2    valsartan (DIOVAN) 320 MG tablet Take 1 tablet (320 mg total) by mouth once daily. 90 tablet 2     Current Schedule Inpatient Medications:   enoxparin  40 mg Subcutaneous Daily     Continuous Infusions:   0.9% NaCl   Intravenous Continuous 75 mL/hr at 01/06/25 0219 New Bag at 01/06/25 0219     Assessment:   CP - Typical & Atypical Features  NSTEMI, Unspecified    - Trop Peak 0.145  HTN Urgency  Sinus Tachycardia   Anemia   HLD  Elevated Liver Enzymes  Electrolyte Derangements    - Hyponatremia   No Hx of GIB    Plan:   Obtain echo.  Start ASA 81 mg daily.  Decrease in H&H and platelets noted. Monitor  closely for signs of bleeding.  Consider [facility] pending echo results & anemia.  He is also reporting tremors.   Resume home meds once med rec complete.     Thank you for your consult.     DEVIN Caldwell  Cardiology  [facility]

## 2025-01-06 NOTE — NURSING
Admission documentation completed by the admit nurse. Home med rec completed by ED nurse. Pt did elect for meds to bed with [facility]. 4 Eyes and standing weight to be completed by the admitting floor nurse.

## 2025-01-06 NOTE — H&P
[facility]  Hospital Medicine History & Physical Examination       Patient Name: Raymon Causey  MRN: [id number]  Patient Class: OP- Observation   Admission Date: 01/06/2025   Admitting Service: Hospital Medicine   Length of Stay: 0  Attending Physician: Dr. Harrington  Primary Care Provider: BRETT Diop Jr., MD  Face-to-Face encounter date: 01/06/2025  Code Status: Full  Chief Complaint: Chest Pain (Pt co CP, SOB, high heart rate and HTN for past 3 days. )      Patient information was obtained from patient, patient's family, past medical records and ER records.    HISTORY OF PRESENT ILLNESS:   Raymon Causey is a 69 y.o. male with a PMHx of hypertension, hyperlipidemia, elevated LFTs, vitamin-D deficiency, prediabetes, osteoarthritis who presented to [facility] on 1/5/2025 with c/o intermittent chest pain x2 days with associated shortness of breath and elevated heart rate.  Chest pain described as sharp, left-sided pain that lasts about 5 minutes on 2 separate episodes.  Shortness of breath was worse with exertion.  He denied any chest pain or shortness for breath at the time of exam.  He reports that his heart rate elevates intermittently up to 150s then drops back down to 90s.  He denied any melena or hematemesis.  States he drank a 6 pack of beer daily for quite some time and stopped drinking on New Year's day.    Vital Signs upon presentation to the ED included /65, , RR 20, SpO2 100% on room air, temperature 98° F.  Labs notable for hemoglobin 10, hematocrit 28.9, sodium 129, CO2 18, glucose 117, , , total bili 2.1.  Troponin 0.145, repeat 0.133 and 0.106.  TSH normal.  CXR negative for acute cardiopulmonary process.  CTA chest negative for PE, no acute abnormality of the chest.  Rectal exam Hemoccult negative.  Cardiology service consulted in ED. Admitted to hospital medicine service.    REVIEW OF SYSTEMS:   Except as documented, all other systems reviewed  and negative.    PAST MEDICAL HISTORY:   Hypertension   Hyperlipidemia   Elevated LFTs   Vitamin-D deficiency   Prediabetes   Osteoarthritis     PAST SURGICAL HISTORY:     Past Surgical History:   Procedure Laterality Date    COLONOSCOPY  04/26/2022    Prosper Adkins MD    LT CATARACT W/IOL 1 STA PHACO (Left) (05/25/2017)      Lumpectomy (1996)      Replacement of Right Lens with Synthetic Substitute, Percutaneous Approach (12/08/2015)      RT CATARACT W/IOL 1 STA PHACO (Right) (12/08/2015)         FAMILY HISTORY:   Mother: Alzheimer's disease, CAD   Father: CHF, hypertension    SOCIAL HISTORY:   Denied alcohol, tobacco or illicit drug use.  Former smoker.    SCREENING FOR SOCIAL DRIVERS FOR HEALTH:   Patient screened for food insecurity, housing instability, transportation needs, utility difficulties, and interpersonal safety (select all that apply as identified as concern):  []Housing or Food  []Transportation Needs  []Utility Difficulties  []Interpersonal safety  [x]None    ALLERGIES:   Patient has no known allergies.    HOME MEDICATIONS:     Prior to Admission medications    Medication Sig Start Date End Date Taking? Authorizing Provider   amLODIPine (NORVASC) 10 MG tablet Take 1 tablet (10 mg total) by mouth once daily. 1/8/24   BRETT Diop Jr., MD   aspirin (ECOTRIN) 81 MG EC tablet Take 81 mg by mouth once daily.    Provider, Historical   cyclobenzaprine (FLEXERIL) 5 MG tablet Take 1 tablet (5 mg total) by mouth 3 (three) times daily as needed for Muscle spasms. 2/27/23   Donna Adkins, NP   meloxicam (MOBIC) 15 MG tablet Take 1 tablet (15 mg total) by mouth daily as needed for Pain. 6/27/23   BRETT Diop Jr., MD   metoprolol succinate (TOPROL-XL) 50 MG 24 hr tablet Take 1 tablet (50 mg total) by mouth once daily. 1/8/24   BRETT Diop Jr., MD   rosuvastatin (CRESTOR) 20 MG tablet Take 1 tablet (20 mg total) by mouth once daily. 6/27/23   BRETT Diop Jr., MD   valsartan  (DIOVAN) 320 MG tablet Take 1 tablet (320 mg total) by mouth once daily. 1/8/24   BRETT Diop Jr., MD     ________________________________________________________________________  INPATIENT LIST OF MEDICATIONS     Current Facility-Administered Medications:     0.9% NaCl infusion, , Intravenous, Continuous, Karlie Treviño AGACNP-BC, Last Rate: 75 mL/hr at 01/06/25 0219, New Bag at 01/06/25 0219    acetaminophen tablet 650 mg, 650 mg, Oral, Q6H PRN, Karlie Treviño AGACNP-BC    aluminum-magnesium hydroxide-simethicone 200-200-20 mg/5 mL suspension 30 mL, 30 mL, Oral, QID PRN, Karlie Treviño AGACNP-BC    enoxaparin injection 40 mg, 40 mg, Subcutaneous, Daily, Karlie Treviño AGACNP-BC    melatonin tablet 6 mg, 6 mg, Oral, Nightly PRN, Karlie Treviño AGACNP-BC    naloxone 0.4 mg/mL injection 0.02 mg, 0.02 mg, Intravenous, PRN, Karlie Treviño AGACNP-BC    ondansetron injection 4 mg, 4 mg, Intravenous, Q4H PRN, Karlie Treviño AGACNP-BC    polyethylene glycol packet 17 g, 17 g, Oral, BID PRN, Karlie Treviño AGACNP-BC    prochlorperazine injection Soln 5 mg, 5 mg, Intravenous, Q6H PRN, Karlie Treviño AGACNP-BC    simethicone chewable tablet 80 mg, 1 tablet, Oral, QID PRN, Karlie Treviño AGACNP-BC    Current Outpatient Medications:     amLODIPine (NORVASC) 10 MG tablet, Take 1 tablet (10 mg total) by mouth once daily., Disp: 90 tablet, Rfl: 2    aspirin (ECOTRIN) 81 MG EC tablet, Take 81 mg by mouth once daily., Disp: , Rfl:     cyclobenzaprine (FLEXERIL) 5 MG tablet, Take 1 tablet (5 mg total) by mouth 3 (three) times daily as needed for Muscle spasms., Disp: 20 tablet, Rfl: 1    meloxicam (MOBIC) 15 MG tablet, Take 1 tablet (15 mg total) by mouth daily as needed for Pain., Disp: 90 tablet, Rfl: 2    metoprolol succinate (TOPROL-XL) 50 MG 24 hr tablet, Take 1 tablet (50 mg total) by mouth once daily., Disp: 90 tablet, Rfl: 2    rosuvastatin (CRESTOR) 20 MG tablet, Take 1 tablet (20 mg  total) by mouth once daily., Disp: 90 tablet, Rfl: 2    valsartan (DIOVAN) 320 MG tablet, Take 1 tablet (320 mg total) by mouth once daily., Disp: 90 tablet, Rfl: 2    Scheduled Meds:   enoxparin  40 mg Subcutaneous Daily     Continuous Infusions:   0.9% NaCl   Intravenous Continuous 75 mL/hr at 01/06/25 0219 New Bag at 01/06/25 0219     PRN Meds:.  Current Facility-Administered Medications:     acetaminophen, 650 mg, Oral, Q6H PRN    aluminum-magnesium hydroxide-simethicone, 30 mL, Oral, QID PRN    melatonin, 6 mg, Oral, Nightly PRN    naloxone, 0.02 mg, Intravenous, PRN    ondansetron, 4 mg, Intravenous, Q4H PRN    polyethylene glycol, 17 g, Oral, BID PRN    prochlorperazine, 5 mg, Intravenous, Q6H PRN    simethicone, 1 tablet, Oral, QID PRN    PHYSICAL EXAM:     VITAL SIGNS: 24 HRS MIN & MAX LAST   Temp  Min: 98 °F (36.7 °C)  Max: 98 °F (36.7 °C) 98 °F (36.7 °C)   BP  Min: 79/43  Max: 188/65 124/66   Pulse  Min: 71  Max: 151  83   Resp  Min: 14  Max: 32 18   SpO2  Min: 95 %  Max: 100 % 99 %       General appearance: Well-developed male in no apparent distress.  HENT: Atraumatic head. Moist mucous membranes of oral cavity.  Eyes: Normal extraocular movements.   Neck: Supple.   Lungs: Clear to auscultation bilaterally.   Heart: Regular rate and rhythm. S1 and S2 present. No pedal edema.  Abdomen: Soft, non-distended, non-tender.  Extremities: No cyanosis, clubbing, or edema.  Skin: No Rash.   Neuro: Motor and sensory exams grossly intact.   Psych/mental status: Awake and alert. Appropriate mood and affect. Responds appropriately to questions.     LABS AND IMAGING:     Recent Labs   Lab 01/05/25  1642 01/06/25  0317   WBC 6.81 5.63   RBC 4.06* 3.55*   HGB 10.0* 8.9*   HCT 28.9* 25.4*   MCV 71.2* 71.5*   MCH 24.6* 25.1*   MCHC 34.6 35.0   RDW 18.5* 18.4*    116*       Recent Labs   Lab 01/05/25  1642 01/06/25  0317   * 130*   K 3.7 3.5    102   CO2 18* 21*   BUN 8.6 8.4   CREATININE 0.91 0.79    CALCIUM 9.0 8.7*   MG  --  1.60   ALBUMIN 3.0* 2.7*   ALKPHOS 183* 155*   ALT 53 49   * 138*   BILITOT 2.1* 1.9*       Microbiology Results (last 7 days)       ** No results found for the last 168 hours. **             CTA Chest Non-Coronary (PE Studies)  Narrative: EXAMINATION:  CTA CHEST NON CORONARY (PE STUDIES)    CLINICAL HISTORY:  Pulmonary embolism (PE) suspected, unknown D-dimer;    TECHNIQUE:  Axial images of the chest were obtained with IV contrast administration.  Coronal and sagittal reconstructions were provided.  Three dimensional and MIP images were obtained and evaluated.  Total DLP was 317 mGy-cm. Dose lowering technique and automated exposure control were utilized for this exam.    COMPARISON:  Chest radiograph 01/05/2025.    FINDINGS:  There is no filling defect within the pulmonary arteries through the level of the subsegmental pulmonary arteries.  There is no CT evidence of right heart strain.    The airway is widely patent.  There is no mediastinal or hilar lymphadenopathy.  The heart is not enlarged.    The calcified pleural plaques in the anterior left hemithorax are unchanged.  The lung parenchyma is otherwise clear.  There is no pulmonary nodule or mass.  There is no pleural effusion.  There is no ground-glass opacity.    The upper abdomen demonstrates no acute abnormality.  There is no lytic or blastic osseous lesion.  Impression: No evidence of pulmonary embolism through the level of the subsegmental pulmonary arteries.  There is no acute abnormality of the chest.    [employer] concordance    Electronically signed by: Escobar Leonard MD  Date:    01/06/2025  Time:    06:08        ASSESSMENT:   Atypical Chest Pain  NSTEMI, unspecified   Acute Microcytic Anemia  Metabolic acidosis  Hyponatremia  Elevated LFTs     History:   hypertension, hyperlipidemia, elevated LFTs, vitamin-D deficiency, prediabetes, osteoarthritis       PLAN:   Cardiac monitoring   Cardiology consultation    Echocardiogram pending   Check ferritin, folate, vitamin B12 and iron panel  NS at 75 mL/hr x10 hrs  Liver ultrasound pending  Hepatitis panel pending  CIWA Ar scale Q 8 hours   Daily multivitamin, folic acid and thiamine  Resume home medications as deemed appropriate once medication reconciliation is updated  Labs in AM    VTE Prophylaxis: SCDs    Discharge Planning and Disposition: TBD    I, Lianne Stein, NP have reviewed and discussed the case with Dr. Harrington.   Please see the attending MD's addendum for further assessment and plan.    Lianne Stein, [facility]  Department of Hospital Medicine   [facility]   01/06/2025    This note was created with the assistance of [employer] Voice Recognition Software. There may be transcription errors as a result of using this technology however minimal, and effort has been made to assure accuracy of transcription, but any obvious errors or omissions should be clarified with the author of the document.    _______________________________________________________________________________  MD Addendum:  I, Dr. Harrington assumed care of this patient today.  For the patient encounter, I performed the substantive portion of the visit, I reviewed the NP/PA documentation, treatment plan, and medical decision making.  I had face to face time with this patient      Patient presents because his heart rate was greater than 160, he tells me that he was at a pharmacy when he checked his pulse. He denies chest pain to me but troponins were initially elevated, though currently downtrending.  Cardiologist has been consulted and it seems patient might have LHC in the a.m.  Patient is currently resting comfortable, vital signs have been reviewed by me and are currently wnl.  All of his needs are currently met  He is alert, awake, oriented and answers questions appropriately     With regard to Hyponatremia, the etiology is not clear; may be due to poor solute intake,  but will evaluate for pseudohyponatremia--check blood glucose and lipids. thyroid levels and medications that may be contributing like diuretics. Will repeat labs and followup on results.       All diagnosis and differential diagnosis have been reviewed; assessment and plan has been documented; I have personally reviewed the labs and test results that are presently available; I have reviewed the patients medication list; I have reviewed the consulting providers response and recommendations. I have reviewed or attempted to review medical records based upon their availability.    I will continue to monitor closely and make adjustments to medical management as needed.     Dr. Terri Harrington DO    I have spent 30 minutes on the day of the visit; time spent includes face to face time and non-face to face time preparing to see the patient (eg, review of tests), independently reviewing and interpreting medical records, both past and current; documenting clinical information in the electronic or other health record, and communicating results to the patient/family/caregiver and care coordinator and nursing team.        01/06/2025

## 2025-01-07 LAB
ALBUMIN SERPL-MCNC: 2.7 G/DL (ref 3.4–4.8)
ALBUMIN/GLOB SERPL: 0.8 RATIO (ref 1.1–2)
ALP SERPL-CCNC: 147 UNIT/L (ref 40–150)
ALT SERPL-CCNC: 50 UNIT/L (ref 0–55)
ANION GAP SERPL CALC-SCNC: 7 MEQ/L
AST SERPL-CCNC: 132 UNIT/L (ref 5–34)
BASOPHILS # BLD AUTO: 0.04 X10(3)/MCL
BASOPHILS NFR BLD AUTO: 0.8 %
BILIRUB SERPL-MCNC: 1.5 MG/DL
BUN SERPL-MCNC: 8.5 MG/DL (ref 8.4–25.7)
CALCIUM SERPL-MCNC: 8.3 MG/DL (ref 8.8–10)
CHLORIDE SERPL-SCNC: 104 MMOL/L (ref 98–107)
CO2 SERPL-SCNC: 21 MMOL/L (ref 23–31)
CREAT SERPL-MCNC: 0.75 MG/DL (ref 0.72–1.25)
CREAT/UREA NIT SERPL: 11
EOSINOPHIL # BLD AUTO: 0.15 X10(3)/MCL (ref 0–0.9)
EOSINOPHIL NFR BLD AUTO: 2.9 %
ERYTHROCYTE [DISTWIDTH] IN BLOOD BY AUTOMATED COUNT: 19.4 % (ref 11.5–17)
GFR SERPLBLD CREATININE-BSD FMLA CKD-EPI: >60 ML/MIN/1.73/M2
GLOBULIN SER-MCNC: 3.4 GM/DL (ref 2.4–3.5)
GLUCOSE SERPL-MCNC: 98 MG/DL (ref 82–115)
HCT VFR BLD AUTO: 27.7 % (ref 42–52)
HGB BLD-MCNC: 9.4 G/DL (ref 14–18)
IMM GRANULOCYTES # BLD AUTO: 0.01 X10(3)/MCL (ref 0–0.04)
IMM GRANULOCYTES NFR BLD AUTO: 0.2 %
LYMPHOCYTES # BLD AUTO: 0.78 X10(3)/MCL (ref 0.6–4.6)
LYMPHOCYTES NFR BLD AUTO: 15 %
MCH RBC QN AUTO: 25.1 PG (ref 27–31)
MCHC RBC AUTO-ENTMCNC: 33.9 G/DL (ref 33–36)
MCV RBC AUTO: 74.1 FL (ref 80–94)
MONOCYTES # BLD AUTO: 0.88 X10(3)/MCL (ref 0.1–1.3)
MONOCYTES NFR BLD AUTO: 16.9 %
NEUTROPHILS # BLD AUTO: 3.34 X10(3)/MCL (ref 2.1–9.2)
NEUTROPHILS NFR BLD AUTO: 64.2 %
NRBC BLD AUTO-RTO: 0 %
OHS CV CPX 85 PERCENT MAX PREDICTED HEART RATE MALE: 129
OHS CV CPX MAX PREDICTED HEART RATE: 152
OHS CV CPX PATIENT IS FEMALE: 0
OHS CV CPX PATIENT IS MALE: 1
OHS CV INITIAL DOSE: 11.3 MCG/KG/MIN
OHS CV PEAK DOSE: 31.7 MCG/KG/MIN
PATH REV: NORMAL
PLATELET # BLD AUTO: 119 X10(3)/MCL (ref 130–400)
PMV BLD AUTO: ABNORMAL FL
POTASSIUM SERPL-SCNC: 3.4 MMOL/L (ref 3.5–5.1)
PROT SERPL-MCNC: 6.1 GM/DL (ref 5.8–7.6)
RBC # BLD AUTO: 3.74 X10(6)/MCL (ref 4.7–6.1)
SODIUM SERPL-SCNC: 132 MMOL/L (ref 136–145)
WBC # BLD AUTO: 5.2 X10(3)/MCL (ref 4.5–11.5)

## 2025-01-07 PROCEDURE — 85025 COMPLETE CBC W/AUTO DIFF WBC: CPT

## 2025-01-07 PROCEDURE — A9500 TC99M SESTAMIBI: HCPCS | Performed by: INTERNAL MEDICINE

## 2025-01-07 PROCEDURE — 36415 COLL VENOUS BLD VENIPUNCTURE: CPT

## 2025-01-07 PROCEDURE — 25000003 PHARM REV CODE 250

## 2025-01-07 PROCEDURE — 36415 COLL VENOUS BLD VENIPUNCTURE: CPT | Performed by: NURSE PRACTITIONER

## 2025-01-07 PROCEDURE — 80053 COMPREHEN METABOLIC PANEL: CPT | Performed by: NURSE PRACTITIONER

## 2025-01-07 PROCEDURE — 63600175 PHARM REV CODE 636 W HCPCS: Performed by: INTERNAL MEDICINE

## 2025-01-07 PROCEDURE — 25000003 PHARM REV CODE 250: Performed by: STUDENT IN AN ORGANIZED HEALTH CARE EDUCATION/TRAINING PROGRAM

## 2025-01-07 PROCEDURE — 21400001 HC TELEMETRY ROOM

## 2025-01-07 RX ORDER — METOPROLOL SUCCINATE 50 MG/1
100 TABLET, EXTENDED RELEASE ORAL DAILY
Status: DISCONTINUED | OUTPATIENT
Start: 2025-01-08 | End: 2025-01-08 | Stop reason: HOSPADM

## 2025-01-07 RX ORDER — REGADENOSON 0.08 MG/ML
0.4 INJECTION, SOLUTION INTRAVENOUS
Status: COMPLETED | OUTPATIENT
Start: 2025-01-07 | End: 2025-01-07

## 2025-01-07 RX ORDER — TETRAKIS(2-METHOXYISOBUTYLISOCYANIDE)COPPER(I) TETRAFLUOROBORATE 1 MG/ML
11.3 INJECTION, POWDER, LYOPHILIZED, FOR SOLUTION INTRAVENOUS
Status: COMPLETED | OUTPATIENT
Start: 2025-01-07 | End: 2025-01-07

## 2025-01-07 RX ORDER — METOPROLOL SUCCINATE 50 MG/1
50 TABLET, EXTENDED RELEASE ORAL ONCE
Status: COMPLETED | OUTPATIENT
Start: 2025-01-07 | End: 2025-01-07

## 2025-01-07 RX ORDER — TETRAKIS(2-METHOXYISOBUTYLISOCYANIDE)COPPER(I) TETRAFLUOROBORATE 1 MG/ML
31.7 INJECTION, POWDER, LYOPHILIZED, FOR SOLUTION INTRAVENOUS
Status: COMPLETED | OUTPATIENT
Start: 2025-01-07 | End: 2025-01-07

## 2025-01-07 RX ADMIN — METOPROLOL SUCCINATE 50 MG: 50 TABLET, EXTENDED RELEASE ORAL at 09:01

## 2025-01-07 RX ADMIN — KIT FOR THE PREPARATION OF TECHNETIUM TC99M SESTAMIBI 11.3 MILLICURIE: 1 INJECTION, POWDER, LYOPHILIZED, FOR SOLUTION PARENTERAL at 11:01

## 2025-01-07 RX ADMIN — REGADENOSON 0.4 MG: 0.08 INJECTION, SOLUTION INTRAVENOUS at 12:01

## 2025-01-07 RX ADMIN — KIT FOR THE PREPARATION OF TECHNETIUM TC99M SESTAMIBI 31.7 MILLICURIE: 1 INJECTION, POWDER, LYOPHILIZED, FOR SOLUTION PARENTERAL at 12:01

## 2025-01-07 RX ADMIN — Medication 81 MG: at 09:01

## 2025-01-07 RX ADMIN — METOPROLOL SUCCINATE 50 MG: 50 TABLET, EXTENDED RELEASE ORAL at 03:01

## 2025-01-07 NOTE — PROGRESS NOTES
[facility]  Hospital Medicine Progress Note      Chief Complaint: chest pain       HPI: (personally reviewed by me and is documented from initial H&P)     Raymon Causey is a 69 y.o. male who  has a past medical history of Pneumonia (12/19/2022) and Verruca (12/19/2022)..    Presented to [facility] on 1/5/2025 with c/o intermittent chest pain x2 days with associated shortness of breath and elevated heart rate.  Chest pain described as sharp, left-sided pain that lasts about 5 minutes on 2 separate episodes.  Shortness of breath was worse with exertion.  He denied any chest pain or shortness for breath at the time of exam.  He reports that his heart rate elevates intermittently up to 150s then drops back down to 90s.  He denied any melena or hematemesis.  States he drank a 6 pack of beer daily for quite some time and stopped drinking on New Year's day.     Vital Signs upon presentation to the ED included /65, , RR 20, SpO2 100% on room air, temperature 98° F.  Labs notable for hemoglobin 10, hematocrit 28.9, sodium 129, CO2 18, glucose 117, , , total bili 2.1.  Troponin 0.145, repeat 0.133 and 0.106.  TSH normal.  CXR negative for acute cardiopulmonary process.  CTA chest negative for PE, no acute abnormality of the chest.  Rectal exam Hemoccult negative.  Cardiology service consulted in ED. Admitted to hospital medicine service.    Interval History:        01/07/2025 patient's wife is at bedside and tells me that he has been drinking 3-6packs of beers for well over 30yrs; he stopped cold turkey the day after New Years; she tells me that Mr. Causey is noncompliant, especially when he is drinking and that he does not go to Dr. Appointments, event the ones that she schedules and is willing to bring him to.  He is getting stress testing today.   No overnight events reported.     Objective Assessment:  Physical Exam      Vital signs have been personally reviewed by me    General: Appears comfortable, no acute distress.  Neuro: awake, alert, orient     Integumentary: Warm, dry, intact.  Musculoskeletal: Purposeful movement noted.     Respiratory: No accessory muscle use. Breath sounds are equal.  Cardiovascular: Regular rate.       VITAL SIGNS: 24 HRS MIN & MAX LAST   Temp  Min: 97.3 °F (36.3 °C)  Max: 98.4 °F (36.9 °C) 97.3 °F (36.3 °C)   BP  Min: 121/47  Max: 155/65 (!) 145/62   Pulse  Min: 84  Max: 96  96   No data recorded 20   SpO2  Min: 98 %  Max: 99 % 98 %     Nuclear Stress - Cardiology Interpreted    Normal myocardial perfusion scan. There is no evidence of myocardial   ischemia or infarction.    The ECG portion of the study is abnormal but not diagnostic.    Recent Labs   Lab 01/05/25 1642 01/06/25 0317 01/07/25  0915   WBC 6.81 5.63 5.20   RBC 4.06* 3.55* 3.74*   HGB 10.0* 8.9* 9.4*   HCT 28.9* 25.4* 27.7*   MCV 71.2* 71.5* 74.1*   MCH 24.6* 25.1* 25.1*   MCHC 34.6 35.0 33.9   RDW 18.5* 18.4* 19.4*    116* 119*       Recent Labs   Lab 01/05/25 1642 01/06/25 0317 01/07/25  0405   * 130* 132*   K 3.7 3.5 3.4*    102 104   CO2 18* 21* 21*   BUN 8.6 8.4 8.5   CREATININE 0.91 0.79 0.75   CALCIUM 9.0 8.7* 8.3*   MG  --  1.60  --    ALBUMIN 3.0* 2.7* 2.7*   ALKPHOS 183* 155* 147   ALT 53 49 50   * 138* 132*   BILITOT 2.1* 1.9* 1.5     Microbiology Results (last 7 days)       ** No results found for the last 168 hours. **               Medications for Hospital Course     Scheduled Med:   amLODIPine  10 mg Oral Daily    aspirin  81 mg Oral Daily    folic acid  1 mg Oral Daily    [START ON 1/8/2025] metoprolol succinate  100 mg Oral Daily    multivitamin  1 tablet Oral Daily    thiamine  100 mg Oral Daily    valsartan  320 mg Oral Daily      Continuous Infusions:       PRN Meds:    Current Facility-Administered Medications:     acetaminophen, 650 mg, Oral, Q6H PRN    aluminum-magnesium hydroxide-simethicone, 30 mL, Oral, QID PRN    chlordiazepoxide,  25 mg, Oral, QID PRN    melatonin, 6 mg, Oral, Nightly PRN    naloxone, 0.02 mg, Intravenous, PRN    ondansetron, 4 mg, Intravenous, Q4H PRN    polyethylene glycol, 17 g, Oral, BID PRN    prochlorperazine, 5 mg, Intravenous, Q6H PRN    simethicone, 1 tablet, Oral, QID PRN     Assessment and Plan          Chronic medical issues:  has a past medical history of Pneumonia (12/19/2022) and Verruca (12/19/2022)..      __________________________________________________________________________________________________________________________________    Atypical Chest Pain  NSTEMI, unspecified  Acute Microcytic Anemia  Metabolic acidosis  Hyponatremia  Elevated LFTs      Followup on stress test.  Continue aspirin therapy   Monitor for ETOH withdrawals.   Continue supportive care  Continue checking vital signs q4hrs.  Reviewed and restarted appropriate home medications.     DVT prophylaxis initiated   Nurse notified to page me if any changes occur         _______________________________________________________________________________________________________________________________      I have spent 40 minutes on the day of the visit; time spent includes face to face time and non-face to face time preparing to see the patient (eg, review of tests), independently reviewing and interpreting medical records, both past and current; documenting clinical information in the electronic or other health record, and communicating results to the patient/family/caregiver and care coordinator and nursing team.      All diagnosis and differential diagnosis have been reviewed,  interpreted and communicated appropriately to care team. assessment and plan has been documented; I have personally reviewed the labs and test results that are presently available and pertinent to this hospital course; I have reviewed medical records based upon their availability.    All of the patient's questions have been  addressed and answered. Patient's is  agreeable to the above stated plan.   I will continue to monitor closely and make adjustments to medical management as needed.    Terri Harrington, DO     01/07/2025     This note was created with the assistance of Dragon voice recognition software. There may be transcription errors as a result of using this technology however minimal. Effort has been made to assure accuracy of transcription but any obvious errors or omissions should be clarified with the author of the document.

## 2025-01-07 NOTE — PROGRESS NOTES
[facility] *    Cardiology  Progress Note    Patient Name: Raymon Causey  MRN: [id number]  Admission Date: 1/5/2025  Hospital Length of Stay: 1 days  Code Status: Full Code   Attending Provider: Chidi Hamilton MD   Consulting Provider: DEVIN Caldwell  Primary Care Physician: BRETT Diop Jr., MD  Principal Problem:<principal problem not specified>    Patient information was obtained from patient, past medical records, and ER records.     Subjective:     Reason for Consult: CP, NSTEMI    HPI: Mr. Causey is a 69 year old male who is known to [facility], Dr. Alas. He presents to the ER with complaints of two episodes of sharp, left sided CP that began on 1.3.25 with each episode lasting 5 minutes. He reports associated symptoms of SOB on exertion and has had to take several breaks while painting a house due to SOB. He also reports tachycardia at home and HTN but denies palpitations, fever, or chills. He reports that he was noncompliant with his medications that morning. Significant labs include H&H 10/28.9 -> 8.9/25.4, Plt 116, Na 130, trop peak 0.145. On arrival, he was found to be tachycardic & hypertensive (188/65). An EKG was obtained and demonstrated Sinus Tachycardia w/ LVH. [facility] has been consulted to further evaluate the patient's CP & elevated troponin.     1.7.25: NAD. Denies current CP, SOB, or palps. Reports occasional chest burning when moving. SR on tele. BP stable. On RA.     PMH: HTN, HLD, glaucoma, Vit D Deficiency, osteoarthritis   PSH: neck surgery, right cataract surgery   Family History: Father - HTN, CA; Mother - HTN, DM 2, Alzheimer's disease  Social History: Former tobacco use. Denies alcohol or illicit drug use.     Previous Cardiac Diagnostics:   TTE (1.6.25):  Left Ventricle: Increased wall thickness. There is mild concentric hypertrophy. There is normal systolic function with a visually estimated ejection fraction of 55 - 60%. There is diastolic  dysfunction.  Right Ventricle: Normal right ventricular cavity size. Systolic function is normal.  Left Atrium: Left atrium is mildly dilated.  Aortic Valve: Aortic valve peak velocity is 1.6 m/s. Mean gradient is 5.0 mmHg.  Mitral Valve: The mean pressure gradient across the mitral valve is 5 mmHg at a heart rate of  bpm. There is mild regurgitation.  Tricuspid Valve: There is mild regurgitation.     MPI (9.4.14):  This is a normal perfusion study, no perfusion defects noted. There is no evidence of ischemia.   This scan is suggestive of low risk for future cardiovascular events.   The left ventricular cavity is noted to be normal on the stress study. The left ventricular ejection fraction was calculated to be 58% and left ventricular global function is normal.   The study quality is good.   Stress ECG is discordant with perfusion study; need clinical correlation.     TTE (9.4.14):  The study quality is below average.   The left ventricle is normal in size. Global left ventricular systolic function is normal. The left ventricular ejection fraction is 65%. Left ventricular diastolic function is normal. Noted left ventricular hypertrophy. It is mild.   Mild (1+) pulmonic regurgitation.      Review of patient's allergies indicates:  No Known Allergies  No current facility-administered medications on file prior to encounter.     Current Outpatient Medications on File Prior to Encounter   Medication Sig    metoprolol succinate (TOPROL-XL) 50 MG 24 hr tablet Take 1 tablet (50 mg total) by mouth once daily.    rosuvastatin (CRESTOR) 20 MG tablet Take 1 tablet (20 mg total) by mouth once daily.    valsartan (DIOVAN) 320 MG tablet Take 1 tablet (320 mg total) by mouth once daily.    amLODIPine (NORVASC) 10 MG tablet Take 1 tablet (10 mg total) by mouth once daily.    aspirin (ECOTRIN) 81 MG EC tablet Take 81 mg by mouth once daily.    cyclobenzaprine (FLEXERIL) 5 MG tablet Take 1 tablet (5 mg total) by mouth 3 (three) times  daily as needed for Muscle spasms.    meloxicam (MOBIC) 15 MG tablet Take 1 tablet (15 mg total) by mouth daily as needed for Pain.       Review of Systems   Respiratory:  Negative for shortness of breath.    Cardiovascular:  Negative for chest pain and palpitations.   Neurological:  Positive for tremors.   All other systems reviewed and are negative.      Objective:     Vital Signs (Most Recent):  Temp: 98.1 °F (36.7 °C) (01/07/25 0658)  Pulse: 86 (01/07/25 0658)  Resp: 20 (01/06/25 1444)  BP: (!) 155/65 (01/07/25 0658)  SpO2: 98 % (01/07/25 0658) Vital Signs (24h Range):  Temp:  [98 °F (36.7 °C)-98.7 °F (37.1 °C)] 98.1 °F (36.7 °C)  Pulse:  [] 86  Resp:  [14-22] 20  SpO2:  [95 %-100 %] 98 %  BP: (121-155)/(47-82) 155/65   Weight: 106.6 kg (235 lb)  Body mass index is 33.72 kg/m².  SpO2: 98 %       Intake/Output Summary (Last 24 hours) at 1/7/2025 0921  Last data filed at 1/7/2025 0445  Gross per 24 hour   Intake 120 ml   Output --   Net 120 ml     Lines/Drains/Airways       Peripheral Intravenous Line  Duration                  Peripheral IV - Single Lumen 01/05/25 1640 20 G Anterior;Proximal;Right Forearm 1 day                  Significant Labs:   Chemistries:   Recent Labs   Lab 01/05/25  1642 01/05/25  2106 01/06/25  0317 01/06/25  0908 01/06/25  1514 01/07/25  0405   *  --  130*  --   --  132*   K 3.7  --  3.5  --   --  3.4*     --  102  --   --  104   CO2 18*  --  21*  --   --  21*   BUN 8.6  --  8.4  --   --  8.5   CREATININE 0.91  --  0.79  --   --  0.75   CALCIUM 9.0  --  8.7*  --   --  8.3*   BILITOT 2.1*  --  1.9*  --   --  1.5   ALKPHOS 183*  --  155*  --   --  147   ALT 53  --  49  --   --  50   *  --  138*  --   --  132*   GLUCOSE 117*  --  112  --   --  98   MG  --   --  1.60  --   --   --    PHOS  --   --  3.4  --   --   --    TROPONINI 0.145* 0.133* 0.106* 0.087* 0.077*  --         CBC/Anemia Labs: [place]:    Recent Labs   Lab 01/05/25  1642 01/06/25  0317 01/06/25  0908  "01/06/25  1002   WBC 6.81 5.63  --   --    HGB 10.0* 8.9*  --   --    HCT 28.9* 25.4*  --   --     116*  --   --    MCV 71.2* 71.5*  --   --    RDW 18.5* 18.4*  --   --    IRON  --   --  30*  --    FERRITIN  --   --  70.94  --    FOLATE  --   --   --  5.1*   [id number]  --   --  1,275*  --     No results for input(s): "PT", "INR", "APTT" in the last 168 hours.     Significant Imaging:  Imaging Results              US Abdomen Limited (Final result)  Result time 01/06/25 10:50:59      Final result by Landen Whittaker MD (01/06/25 10:50:59)                   Impression:      1. Question hepatic steatosis.  2. Gallbladder not visualized.  3. Borderline extrahepatic biliary ductal dilatation.      Electronically signed by: Landen Whittaker  Date:    01/06/2025  Time:    10:50               Narrative:    EXAMINATION:  US ABDOMEN LIMITED    CLINICAL HISTORY:  Elevated LFTs, T-bili;    COMPARISON:  Chest CT yesterday.    FINDINGS:  Grayscale, color and spectral doppler evaluation of the right upper quadrant.    Pancreas obscured.  Imaged portions of aorta and IVC normal in caliber.    Liver is not significantly enlarged.  There is heterogeneous hepatic parenchymal echogenicity.  Normal hepatopetal flow is noted in the portal vein.    Gallbladder is not visualized.  Common bile duct measures 6 mm.    Right kidney measures 13 cm in length. No hydronephrosis.                                       CTA Chest Non-Coronary (PE Studies) (Final result)  Result time 01/06/25 06:08:07      Final result by Escobar Leonard MD (01/06/25 06:08:07)                   Impression:      No evidence of pulmonary embolism through the level of the subsegmental pulmonary arteries.  There is no acute abnormality of the chest.    [facility] concordance      Electronically signed by: Escobar Leonard MD  Date:    01/06/2025  Time:    06:08               Narrative:    EXAMINATION:  CTA CHEST NON CORONARY (PE STUDIES)    CLINICAL HISTORY:  Pulmonary embolism " (PE) suspected, unknown D-dimer;    TECHNIQUE:  Axial images of the chest were obtained with IV contrast administration.  Coronal and sagittal reconstructions were provided.  Three dimensional and MIP images were obtained and evaluated.  Total DLP was 317 mGy-cm. Dose lowering technique and automated exposure control were utilized for this exam.    COMPARISON:  Chest radiograph 01/05/2025.    FINDINGS:  There is no filling defect within the pulmonary arteries through the level of the subsegmental pulmonary arteries.  There is no CT evidence of right heart strain.    The airway is widely patent.  There is no mediastinal or hilar lymphadenopathy.  The heart is not enlarged.    The calcified pleural plaques in the anterior left hemithorax are unchanged.  The lung parenchyma is otherwise clear.  There is no pulmonary nodule or mass.  There is no pleural effusion.  There is no ground-glass opacity.    The upper abdomen demonstrates no acute abnormality.  There is no lytic or blastic osseous lesion.                        Preliminary result by Alverto Gallegos MD (01/05/25 23:26:20)                   Impression:    1. No filling defects are seen in the pulmonary arteries to suggest pulmonary embolus.  2. No acute focal infiltrate or consolidation is seen.  3. Details and other findings as discussed above.               Narrative:    START OF REPORT:  Technique: CT Scan of the chest was performed with intravenous contrast with direct axial images as well as sagittal and coronal reconstruction images pulmonary embolus protocol.    Dosage Information: Automated Exposure Control was utilized 316.57 mGy.cm.    Comparison: None.    Clinical History: Patient is a 69 year old male with a history of hypertension that presents to ED for chest pain 2 days ago. Patient reports two episodes of sharp, left-sided chest pain on Friday, 01/03 with each episode lasting about 5 minutes. He also reports shortness of breath with exertion  yesterday. Notes having to take several breaks while painting a house yesterday due to the shortness of breath. He reports tachycardia at home this morning.    Findings:  Soft Tissues: Unremarkable.  Lines and Tubes: None.  Neck: The visualized soft tissues of the neck appear unremarkable.  Mediastinum: The mediastinal structures are within normal limits.  Heart: The heart size is within normal limits. Mild cardiomegaly is seen. Moderate coronary artery calcification is seen.  Aorta: No aortic dissection or aneurysm is seen. Moderate aortic calcification is seen in the thoracic aorta.  Pulmonary Arteries: No filling defects are seen in the pulmonary arteries to suggest pulmonary embolus.  Lungs: No acute focal infiltrate or consolidation is seen.  Pleura: No effusions or pneumothorax are identified. There is pleural calcification along the right lower anterolateral chest wall.  Bony Structures:  Spine: Moderate spondylolytic changes are seen in the thoracic spine.  Ribs: The ribs appear unremarkable.  Abdomen: The visualized upper abdominal organs appear unremarkable.                                         X-Ray Chest 1 View (Final result)  Result time 01/05/25 17:09:23      Final result by Zafar Dillon MD (01/05/25 17:09:23)                   Impression:      No acute cardiopulmonary process identified.      Electronically signed by: Zafar Dillon  Date:    01/05/2025  Time:    17:09               Narrative:    EXAMINATION:  XR CHEST 1 VIEW    CLINICAL HISTORY:  Shortness of breath    TECHNIQUE:  One view    COMPARISON:  May 24, 2018.    FINDINGS:  Cardiopericardial silhouette is within normal limits.  Right lower chronic pleural scarring with calcification is with similar appearance.  No acute dense focal or segmental consolidation, congestive process, pleural effusions or pneumothorax.                                    EKG:       Telemetry:  ST    Physical Exam  HENT:      Head: Normocephalic.      Nose:  Nose normal.      Mouth/Throat:      Mouth: Mucous membranes are dry.   Eyes:      Extraocular Movements: Extraocular movements intact.   Cardiovascular:      Rate and Rhythm: Normal rate and regular rhythm.      Pulses: Normal pulses.   Pulmonary:      Effort: Pulmonary effort is normal.      Breath sounds: Normal breath sounds.   Abdominal:      Palpations: Abdomen is soft.   Skin:     General: Skin is warm.   Neurological:      Mental Status: He is alert and oriented to person, place, and time. Mental status is at baseline.      Comments: Tremors noted   Psychiatric:         Mood and Affect: Mood normal.         Behavior: Behavior normal.       Home Medications:   No current facility-administered medications on file prior to encounter.     Current Outpatient Medications on File Prior to Encounter   Medication Sig Dispense Refill    metoprolol succinate (TOPROL-XL) 50 MG 24 hr tablet Take 1 tablet (50 mg total) by mouth once daily. 90 tablet 2    rosuvastatin (CRESTOR) 20 MG tablet Take 1 tablet (20 mg total) by mouth once daily. 90 tablet 2    valsartan (DIOVAN) 320 MG tablet Take 1 tablet (320 mg total) by mouth once daily. 90 tablet 2    amLODIPine (NORVASC) 10 MG tablet Take 1 tablet (10 mg total) by mouth once daily. 90 tablet 2    aspirin (ECOTRIN) 81 MG EC tablet Take 81 mg by mouth once daily.      cyclobenzaprine (FLEXERIL) 5 MG tablet Take 1 tablet (5 mg total) by mouth 3 (three) times daily as needed for Muscle spasms. 20 tablet 1    meloxicam (MOBIC) 15 MG tablet Take 1 tablet (15 mg total) by mouth daily as needed for Pain. 90 tablet 2     Current Schedule Inpatient Medications:   amLODIPine  10 mg Oral Daily    aspirin  81 mg Oral Daily    folic acid  1 mg Oral Daily    metoprolol succinate  50 mg Oral Daily    multivitamin  1 tablet Oral Daily    thiamine  100 mg Oral Daily    valsartan  320 mg Oral Daily     Continuous Infusions:      Assessment:   CP - Typical & Atypical Features  NSTEMI,  Unspecified    - Trop Peak 0.145  HTN Urgency - Improved   Sinus Tachycardia - Improving   Anemia   HLD  Elevated Liver Enzymes  Electrolyte Derangements    - Hyponatremia   No Hx of GIB    Plan:   Echo reviewed.  Will plan for [employer] pet stress test today. Keep NPO.  Continue ASA 81 mg daily.   Increase to Toprol  mg daily. Give additional 50 mg x 1 today (to equal 100 mg total today). His heart continues to fluctuate.   Other medical management to primary team.       DEVIN Caldwell  Cardiology  [facility]     I agree with the findings of the complexity of problems addressed and take responsibility for the plan's risks and complications. I approved the plan documented by Bethany Leary NP.

## 2025-01-07 NOTE — PLAN OF CARE
01/07/25 1207   Discharge Assessment   Assessment Type Discharge Planning Assessment   Confirmed/corrected address, phone number and insurance Yes   Confirmed Demographics Correct on Facesheet   Source of Information patient;family   If unable to respond/provide information was family/caregiver contacted? Yes  (at  bedside)   Contact Name/Number MariumMontserrat duarte (Spouse)  [phone] (   When was your last doctors appointment?   (last yr)   Communicated DMITRIY with patient/caregiver Date not available/Unable to determine   Reason For Admission SOB   People in Home spouse   Do you expect to return to your current living situation? Yes   Do you have help at home or someone to help you manage your care at home? Yes   Who are your caregiver(s) and their phone number(s)? Montserrat Causey (Spouse)  [phone] (   Prior to hospitilization cognitive status: Alert/Oriented   Current cognitive status: Alert/Oriented   Walking or Climbing Stairs Difficulty no   Dressing/Bathing Difficulty no   Equipment Currently Used at Home blood pressure machine   Readmission within 30 days? No   Patient currently being followed by outpatient case management? No   Do you currently have service(s) that help you manage your care at home? No   Do you take prescription medications? Yes   Do you have prescription coverage? Yes   Coverage [employer]   Do you have any problems affording any of your prescribed medications? No   Is the patient taking medications as prescribed? yes   Who is going to help you get home at discharge? Montserrat Causey (Spouse)  [phone] (   How do you get to doctors appointments? car, drives self   Are you on dialysis? No   Do you take coumadin? No   Discharge Plan A Home with family   Discharge Plan B Home with family   DME Needed Upon Discharge  none   Discharge Plan discussed with: Patient;Spouse/sig other   Name(s) and Number(s) Montserrat Causey (Spouse)  [phone] (   Transition of Care Barriers None   Physical Activity   On  average, how many days per week do you engage in moderate to strenuous exercise (like a brisk walk)? 0 days   On average, how many minutes do you engage in exercise at this level? 0 min   Financial Resource Strain   How hard is it for you to pay for the very basics like food, housing, medical care, and heating? Not very   Housing Stability   In the last 12 months, was there a time when you were not able to pay the mortgage or rent on time? N   At any time in the past 12 months, were you homeless or living in a shelter (including now)? N   Transportation Needs   Has the lack of transportation kept you from medical appointments, meetings, work or from getting things needed for daily living? No   Food Insecurity   Within the past 12 months, you worried that your food would run out before you got the money to buy more. Never true   Within the past 12 months, the food you bought just didn't last and you didn't have money to get more. Never true   Stress   Do you feel stress - tense, restless, nervous, or anxious, or unable to sleep at night because your mind is troubled all the time - these days? To some exte   Social Isolation   How often do you feel lonely or isolated from those around you?  Never   Alcohol Use   Q1: How often do you have a drink containing alcohol? 4 or more ti   Q2: How many drinks containing alcohol do you have on a typical day when you are drinking? 7 to 9   Q3: How often do you have six or more drinks on one occasion? Never   Utilities   In the past 12 months has the electric, gas, oil, or water company threatened to shut off services in your home? No   Health Literacy   How often do you need to have someone help you when you read instructions, pamphlets, or other written material from your doctor or pharmacy? Often   OTHER   Name(s) of People in Home Montserrat Causey (Spouse)  [phone] (

## 2025-01-08 VITALS
HEIGHT: 70 IN | DIASTOLIC BLOOD PRESSURE: 59 MMHG | TEMPERATURE: 99 F | RESPIRATION RATE: 12 BRPM | HEART RATE: 67 BPM | WEIGHT: 235 LBS | BODY MASS INDEX: 33.64 KG/M2 | SYSTOLIC BLOOD PRESSURE: 147 MMHG | OXYGEN SATURATION: 98 %

## 2025-01-08 PROBLEM — R07.9 CHEST PAIN: Status: ACTIVE | Noted: 2025-01-08

## 2025-01-08 PROBLEM — R00.0 SINUS TACHYCARDIA: Status: ACTIVE | Noted: 2025-01-08

## 2025-01-08 LAB
AMPHET UR QL SCN: NEGATIVE
BARBITURATE SCN PRESENT UR: NEGATIVE
BENZODIAZ UR QL SCN: NEGATIVE
CANNABINOIDS UR QL SCN: POSITIVE
COCAINE UR QL SCN: NEGATIVE
FENTANYL UR QL SCN: NEGATIVE
HEMATOLOGIST REVIEW: NORMAL
MDMA UR QL SCN: NEGATIVE
OPIATES UR QL SCN: NEGATIVE
PCP UR QL: NEGATIVE
PH UR: 6 [PH] (ref 3–11)
SPECIFIC GRAVITY, URINE AUTO (.000) (OHS): 1.02 (ref 1–1.03)

## 2025-01-08 PROCEDURE — 80307 DRUG TEST PRSMV CHEM ANLYZR: CPT

## 2025-01-08 PROCEDURE — 36415 COLL VENOUS BLD VENIPUNCTURE: CPT | Performed by: STUDENT IN AN ORGANIZED HEALTH CARE EDUCATION/TRAINING PROGRAM

## 2025-01-08 RX ORDER — METOPROLOL SUCCINATE 100 MG/1
100 TABLET, EXTENDED RELEASE ORAL DAILY
Qty: 30 TABLET | Refills: 0 | Status: SHIPPED | OUTPATIENT
Start: 2025-01-08

## 2025-01-08 RX ORDER — FOLIC ACID 1 MG/1
1 TABLET ORAL DAILY
Start: 2025-01-08 | End: 2026-01-08

## 2025-01-08 NOTE — PROGRESS NOTES
[facility] *    Cardiology  Progress Note    Patient Name: Raymon Causey  MRN: [id number]  Admission Date: 1/5/2025  Hospital Length of Stay: 2 days  Code Status: Full Code   Attending Provider: Chidi Hamilton MD   Consulting Provider: DEVIN Brothers  Primary Care Physician: BRETT Diop Jr., MD  Principal Problem:<principal problem not specified>    Patient information was obtained from patient, past medical records, and ER records.     Subjective:     Reason for Consult: CP, NSTEMI    HPI: Mr. Causey is a 69 year old male who is known to [facility], Dr. Alas. He presents to the ER with complaints of two episodes of sharp, left sided CP that began on 1.3.25 with each episode lasting 5 minutes. He reports associated symptoms of SOB on exertion and has had to take several breaks while painting a house due to SOB. He also reports tachycardia at home and HTN but denies palpitations, fever, or chills. He reports that he was noncompliant with his medications that morning. Significant labs include H&H 10/28.9 -> 8.9/25.4, Plt 116, Na 130, trop peak 0.145. On arrival, he was found to be tachycardic & hypertensive (188/65). An EKG was obtained and demonstrated Sinus Tachycardia w/ LVH. [facility] has been consulted to further evaluate the patient's CP & elevated troponin.     1.7.25: NAD. Denies current CP, SOB, or palps. Reports occasional chest burning when moving. SR on tele. BP stable. On RA.       PMH: HTN, HLD, glaucoma, Vit D Deficiency, osteoarthritis   PSH: neck surgery, right cataract surgery   Family History: Father - HTN, CA; Mother - HTN, DM 2, Alzheimer's disease  Social History: Former tobacco use. Denies alcohol or illicit drug use.     Previous Cardiac Diagnostics:   SPECT MPI (1.7.25)    Normal myocardial perfusion scan. There is no evidence of myocardial ischemia or infarction.    The ECG portion of the study is abnormal but not diagnostic.    TTE (1.6.25):  Left Ventricle: Increased  wall thickness. There is mild concentric hypertrophy. There is normal systolic function with a visually estimated ejection fraction of 55 - 60%. There is diastolic dysfunction.  Right Ventricle: Normal right ventricular cavity size. Systolic function is normal.  Left Atrium: Left atrium is mildly dilated.  Aortic Valve: Aortic valve peak velocity is 1.6 m/s. Mean gradient is 5.0 mmHg.  Mitral Valve: The mean pressure gradient across the mitral valve is 5 mmHg at a heart rate of  bpm. There is mild regurgitation.  Tricuspid Valve: There is mild regurgitation.     MPI (9.4.14):  This is a normal perfusion study, no perfusion defects noted. There is no evidence of ischemia.   This scan is suggestive of low risk for future cardiovascular events.   The left ventricular cavity is noted to be normal on the stress study. The left ventricular ejection fraction was calculated to be 58% and left ventricular global function is normal.   The study quality is good.   Stress ECG is discordant with perfusion study; need clinical correlation.     TTE (9.4.14):  The study quality is below average.   The left ventricle is normal in size. Global left ventricular systolic function is normal. The left ventricular ejection fraction is 65%. Left ventricular diastolic function is normal. Noted left ventricular hypertrophy. It is mild.   Mild (1+) pulmonic regurgitation.      Review of patient's allergies indicates:  No Known Allergies  No current facility-administered medications on file prior to encounter.     Current Outpatient Medications on File Prior to Encounter   Medication Sig    metoprolol succinate (TOPROL-XL) 50 MG 24 hr tablet Take 1 tablet (50 mg total) by mouth once daily.    rosuvastatin (CRESTOR) 20 MG tablet Take 1 tablet (20 mg total) by mouth once daily.    valsartan (DIOVAN) 320 MG tablet Take 1 tablet (320 mg total) by mouth once daily.    amLODIPine (NORVASC) 10 MG tablet Take 1 tablet (10 mg total) by mouth once  daily.    aspirin (ECOTRIN) 81 MG EC tablet Take 81 mg by mouth once daily.    cyclobenzaprine (FLEXERIL) 5 MG tablet Take 1 tablet (5 mg total) by mouth 3 (three) times daily as needed for Muscle spasms.    meloxicam (MOBIC) 15 MG tablet Take 1 tablet (15 mg total) by mouth daily as needed for Pain.       Review of Systems   Unable to perform ROS: Other (Pt discharged prior to being seen)       Objective:     Vital Signs (Most Recent):  Temp: 98.4 °F (36.9 °C) (01/08/25 0712)  Pulse: 74 (01/08/25 0712)  Resp: 14 (01/08/25 0712)  BP: (!) 180/87 (reported to nurse) (01/08/25 0712)  SpO2: 100 % (01/08/25 0712) Vital Signs (24h Range):  Temp:  [97.3 °F (36.3 °C)-98.5 °F (36.9 °C)] 98.4 °F (36.9 °C)  Pulse:  [55-96] 74  Resp:  [14-16] 14  SpO2:  [98 %-100 %] 100 %  BP: (130-180)/(62-87) 180/87   Weight: 106.6 kg (235 lb)  Body mass index is 33.72 kg/m².  SpO2: 100 %       Intake/Output Summary (Last 24 hours) at 1/8/2025 0918  Last data filed at 1/7/2025 2142  Gross per 24 hour   Intake 420 ml   Output --   Net 420 ml     Lines/Drains/Airways       Peripheral Intravenous Line  Duration                  Peripheral IV - Single Lumen 01/05/25 1640 20 G Anterior;Proximal;Right Forearm 2 days                  Significant Labs:   Chemistries:   Recent Labs   Lab 01/05/25  1642 01/05/25  2106 01/06/25  0317 01/06/25  0908 01/06/25  1514 01/07/25  0405   *  --  130*  --   --  132*   K 3.7  --  3.5  --   --  3.4*     --  102  --   --  104   CO2 18*  --  21*  --   --  21*   BUN 8.6  --  8.4  --   --  8.5   CREATININE 0.91  --  0.79  --   --  0.75   CALCIUM 9.0  --  8.7*  --   --  8.3*   BILITOT 2.1*  --  1.9*  --   --  1.5   ALKPHOS 183*  --  155*  --   --  147   ALT 53  --  49  --   --  50   *  --  138*  --   --  132*   GLUCOSE 117*  --  112  --   --  98   MG  --   --  1.60  --   --   --    PHOS  --   --  3.4  --   --   --    TROPONINI 0.145* 0.133* 0.106* 0.087* 0.077*  --         CBC/Anemia Labs: Vernell:   "  Recent Labs   Lab 01/05/25  1642 01/06/25  0317 01/06/25  0908 01/06/25  1002 01/07/25  0915   WBC 6.81 5.63  --   --  5.20   HGB 10.0* 8.9*  --   --  9.4*   HCT 28.9* 25.4*  --   --  27.7*    116*  --   --  119*   MCV 71.2* 71.5*  --   --  74.1*   RDW 18.5* 18.4*  --   --  19.4*   IRON  --   --  30*  --   --    FERRITIN  --   --  70.94  --   --    FOLATE  --   --   --  5.1*  --    [id number]  --   --  1,275*  --   --     No results for input(s): "PT", "INR", "APTT" in the last 168 hours.     Significant Imaging:  Imaging Results              US Abdomen Limited (Final result)  Result time 01/06/25 10:50:59      Final result by Landen Whittaker MD (01/06/25 10:50:59)                   Impression:      1. Question hepatic steatosis.  2. Gallbladder not visualized.  3. Borderline extrahepatic biliary ductal dilatation.      Electronically signed by: Landen Whittaker  Date:    01/06/2025  Time:    10:50               Narrative:    EXAMINATION:  US ABDOMEN LIMITED    CLINICAL HISTORY:  Elevated LFTs, T-bili;    COMPARISON:  Chest CT yesterday.    FINDINGS:  Grayscale, color and spectral doppler evaluation of the right upper quadrant.    Pancreas obscured.  Imaged portions of aorta and IVC normal in caliber.    Liver is not significantly enlarged.  There is heterogeneous hepatic parenchymal echogenicity.  Normal hepatopetal flow is noted in the portal vein.    Gallbladder is not visualized.  Common bile duct measures 6 mm.    Right kidney measures 13 cm in length. No hydronephrosis.                                       CTA Chest Non-Coronary (PE Studies) (Final result)  Result time 01/06/25 06:08:07      Final result by Escobar Leonard MD (01/06/25 06:08:07)                   Impression:      No evidence of pulmonary embolism through the level of the subsegmental pulmonary arteries.  There is no acute abnormality of the chest.    [facility] concordance      Electronically signed by: Escobar Leonard, " MD  Date:    01/06/2025  Time:    06:08               Narrative:    EXAMINATION:  CTA CHEST NON CORONARY (PE STUDIES)    CLINICAL HISTORY:  Pulmonary embolism (PE) suspected, unknown D-dimer;    TECHNIQUE:  Axial images of the chest were obtained with IV contrast administration.  Coronal and sagittal reconstructions were provided.  Three dimensional and MIP images were obtained and evaluated.  Total DLP was 317 mGy-cm. Dose lowering technique and automated exposure control were utilized for this exam.    COMPARISON:  Chest radiograph 01/05/2025.    FINDINGS:  There is no filling defect within the pulmonary arteries through the level of the subsegmental pulmonary arteries.  There is no CT evidence of right heart strain.    The airway is widely patent.  There is no mediastinal or hilar lymphadenopathy.  The heart is not enlarged.    The calcified pleural plaques in the anterior left hemithorax are unchanged.  The lung parenchyma is otherwise clear.  There is no pulmonary nodule or mass.  There is no pleural effusion.  There is no ground-glass opacity.    The upper abdomen demonstrates no acute abnormality.  There is no lytic or blastic osseous lesion.                        Preliminary result by Alverto Gallegos MD (01/05/25 23:26:20)                   Impression:    1. No filling defects are seen in the pulmonary arteries to suggest pulmonary embolus.  2. No acute focal infiltrate or consolidation is seen.  3. Details and other findings as discussed above.               Narrative:    START OF REPORT:  Technique: CT Scan of the chest was performed with intravenous contrast with direct axial images as well as sagittal and coronal reconstruction images pulmonary embolus protocol.    Dosage Information: Automated Exposure Control was utilized 316.57 mGy.cm.    Comparison: None.    Clinical History: Patient is a 69 year old male with a history of hypertension that presents to ED for chest pain 2 days ago. Patient reports  two episodes of sharp, left-sided chest pain on Friday, 01/03 with each episode lasting about 5 minutes. He also reports shortness of breath with exertion yesterday. Notes having to take several breaks while painting a house yesterday due to the shortness of breath. He reports tachycardia at home this morning.    Findings:  Soft Tissues: Unremarkable.  Lines and Tubes: None.  Neck: The visualized soft tissues of the neck appear unremarkable.  Mediastinum: The mediastinal structures are within normal limits.  Heart: The heart size is within normal limits. Mild cardiomegaly is seen. Moderate coronary artery calcification is seen.  Aorta: No aortic dissection or aneurysm is seen. Moderate aortic calcification is seen in the thoracic aorta.  Pulmonary Arteries: No filling defects are seen in the pulmonary arteries to suggest pulmonary embolus.  Lungs: No acute focal infiltrate or consolidation is seen.  Pleura: No effusions or pneumothorax are identified. There is pleural calcification along the right lower anterolateral chest wall.  Bony Structures:  Spine: Moderate spondylolytic changes are seen in the thoracic spine.  Ribs: The ribs appear unremarkable.  Abdomen: The visualized upper abdominal organs appear unremarkable.                                         X-Ray Chest 1 View (Final result)  Result time 01/05/25 17:09:23      Final result by Zafar Dillon MD (01/05/25 17:09:23)                   Impression:      No acute cardiopulmonary process identified.      Electronically signed by: Zafar Dillon  Date:    01/05/2025  Time:    17:09               Narrative:    EXAMINATION:  XR CHEST 1 VIEW    CLINICAL HISTORY:  Shortness of breath    TECHNIQUE:  One view    COMPARISON:  May 24, 2018.    FINDINGS:  Cardiopericardial silhouette is within normal limits.  Right lower chronic pleural scarring with calcification is with similar appearance.  No acute dense focal or segmental consolidation, congestive process,  pleural effusions or pneumothorax.                                    EKG:       Telemetry:      Physical Exam  Home Medications:   No current facility-administered medications on file prior to encounter.     Current Outpatient Medications on File Prior to Encounter   Medication Sig Dispense Refill    metoprolol succinate (TOPROL-XL) 50 MG 24 hr tablet Take 1 tablet (50 mg total) by mouth once daily. 90 tablet 2    rosuvastatin (CRESTOR) 20 MG tablet Take 1 tablet (20 mg total) by mouth once daily. 90 tablet 2    valsartan (DIOVAN) 320 MG tablet Take 1 tablet (320 mg total) by mouth once daily. 90 tablet 2    amLODIPine (NORVASC) 10 MG tablet Take 1 tablet (10 mg total) by mouth once daily. 90 tablet 2    aspirin (ECOTRIN) 81 MG EC tablet Take 81 mg by mouth once daily.      cyclobenzaprine (FLEXERIL) 5 MG tablet Take 1 tablet (5 mg total) by mouth 3 (three) times daily as needed for Muscle spasms. 20 tablet 1    meloxicam (MOBIC) 15 MG tablet Take 1 tablet (15 mg total) by mouth daily as needed for Pain. 90 tablet 2     Current Schedule Inpatient Medications:   amLODIPine  10 mg Oral Daily    aspirin  81 mg Oral Daily    folic acid  1 mg Oral Daily    metoprolol succinate  100 mg Oral Daily    multivitamin  1 tablet Oral Daily    thiamine  100 mg Oral Daily    valsartan  320 mg Oral Daily     Continuous Infusions:      Assessment:   CP - Typical & Atypical Features  NSTEMI Type II Due to HTN Urgency and ST    - Trop Peak 0.145  HTN Urgency - Improved   Sinus Tachycardia (Resolved)  Anemia   HLD  Elevated Liver Enzymes  Electrolyte Derangements    - Hyponatremia   No Hx of GIB    Plan:   Echo reviewed.  Nuclear Stress Test Normal (No evidence of Myocardial Ischemia or Infarction)  Continue ASA 81 mg daily.   Continue Toprol  mg daily. (HR stable)  Keep K > 4.0 and Mg > 2.0  Other medical management to primary team.   Ok to d/c from cardiac standpoint  F/U with Dr. Alas 1.10.25 @ 2:00 PM      Patient discharged  prior to being seen.  Vitals, Labs, and Diagnostics reviewed.     Ally Brown, BELLAP  Cardiology  [facility]

## 2025-01-08 NOTE — DISCHARGE SUMMARY
[facility] Hospital Medicine Discharge Summary    Admit Date: 1/5/2025  Discharge Date and Time: 1/8/202510:59 AM    Admitting Physician: MARISSA Team  Discharging Physician: Terri Harrington DO.  Primary Care Physician: BRETT Diop Jr., MD    Discharge Diagnoses:  Atypical Chest Pain  NSTEMI, unspecified   Acute Microcytic Anemia  Metabolic acidosis  Hyponatremia  Elevated LFTs      History:   hypertension, hyperlipidemia, elevated LFTs, vitamin-D deficiency, prediabetes, osteoarthritis     Hospital Course:     69 y.o. male with a PMHx of hypertension, hyperlipidemia, elevated LFTs, vitamin-D deficiency, prediabetes, osteoarthritis who presented to [facility] on 1/5/2025 with c/o intermittent chest pain x2 days with associated shortness of breath and elevated heart rate.  Chest pain described as sharp, left-sided pain that lasts about 5 minutes on 2 separate episodes.  Shortness of breath was worse with exertion.  He denied any chest pain or shortness for breath at the time of exam.  He reports that his heart rate elevates intermittently up to 150s then drops back down to 90s.  He denied any melena or hematemesis.  States he drank a 6 pack of beer daily for quite some time and stopped drinking on New Year's day.     Vital Signs upon presentation to the ED included /65, , RR 20, SpO2 100% on room air, temperature 98° F.  Labs notable for hemoglobin 10, hematocrit 28.9, sodium 129, CO2 18, glucose 117, , , total bili 2.1.  Troponin 0.145, repeat 0.133 and 0.106.  TSH normal.  CXR negative for acute cardiopulmonary process.  CTA chest negative for PE, no acute abnormality of the chest.  Rectal exam Hemoccult negative.  Cardiology service consulted in ED. Admitted to hospital medicine service.    wife is at bedside and tells me that he has been drinking 3-6packs of beers for well over 30yrs; he stopped cold turkey the day after New Years; she tells me that   Marium is noncompliant, especially when he is drinking and that he does not go to DrEllis Krishnan, event the ones that she schedules and is willing to bring him to.  S/p stress testing, nothing noted.   Cardiologist will followup with patient in outpatient  Patient's wife at bedside tells me that he was given metoprolol along time ago but was told to stop taking it, he has been location at home but wife tells me it is very old.  Will call in 100 mg for 30 days but patient is strongly advised to follow up with his PCP and cardiologist's appropriately  Patient is a heavy drinker, recommended alcohol cessation, folic acid/multivitamin strongly recommended    Hospital course and discharge care plan has been discussed with patient/and or family , patient/ and or family  voices understanding and agreement with plan. All questions have been answered to the best of my ability. Patient is advised to return to ED or call 911 in case of emergency and or if symptoms worsen.      Vitals:  VITAL SIGNS: 24 HRS MIN & MAX LAST   Temp  Min: 97.3 °F (36.3 °C)  Max: 98.5 °F (36.9 °C) 98.4 °F (36.9 °C)   BP  Min: 130/70  Max: 180/87 (!) 180/87 (reported to nurse)   Pulse  Min: 55  Max: 96  74   Resp  Min: 14  Max: 16 14   SpO2  Min: 98 %  Max: 100 % 100 %       Physical Exam:    General: Appears comfortable, no acute distress.  Integumentary: Warm, dry, intact.    Musculoskeletal: Purposeful movement noted.   Respiratory: No accessory muscle use. Breath sounds are equal.  Cardiovascular: Regular rate. No peripheral edema.      _______________________________________________________________________________________________________________________________________________________    Explained in detail to the patient about the discharge plan, medications, and follow-up visits. Pt understands and agrees with the treatment plan  Discharge Disposition:   Home   Discharged Condition: stable  Diet-   Dietary Orders (From admission, onward)        Start     Ordered    01/07/25 1829  Diet Heart Healthy  Diet effective now         01/07/25 1828                   Medications Per DC med rec  Activities as tolerated   Follow-up Information       Schedule an appointment as soon as possible for a visit  with BRETT Diop Jr., MD.    Specialty: Family Medicine  Why: As soon as possible  Contact information:  [address]  [place] [address]  [phone]               Schedule an appointment as soon as possible for a visit  with Daniel Alas MD.    Specialty: Cardiology  Why: As soon as possible  Contact information:  [address]  [place] [address]  [phone]               Go to  [facility] - Emergency Dept.    Specialty: Emergency Medicine  Why: If symptoms worsen, As needed  Contact information:  [address] [facility] [address] [phone]                         For further questions contact hospitalist office    Discharge time 33 minutes    For worsening symptoms, chest pain, shortness of breath, increased abdominal pain, high grade fever, stroke or stroke like symptoms, immediately go to the nearest Emergency Room or call 911 as soon as possible.      Terri Rios M.D, on 1/8/2025. at 10:59 AM.    Please see below for significant diagnostic studies and test.   _______________________________________________________________________________________________________________________________________    Significant Diagnostic Studies: See Full reports for all details  Procedures Performed: No admission procedures for hospital encounter.         Recent Labs   Lab 01/05/25  1642 01/06/25  0317 01/07/25  0915   WBC 6.81 5.63 5.20   RBC 4.06* 3.55* 3.74*   HGB 10.0* 8.9* 9.4*   HCT 28.9* 25.4* 27.7*   MCV 71.2* 71.5* 74.1*   MCH 24.6* 25.1* 25.1*   MCHC 34.6 35.0 33.9   RDW 18.5* 18.4* 19.4*    116* 119*       Recent Labs   Lab 01/05/25  1642 01/06/25  0317 01/07/25  0405   * 130* 132*   K 3.7 3.5  3.4*    102 104   CO2 18* 21* 21*   BUN 8.6 8.4 8.5   CREATININE 0.91 0.79 0.75   CALCIUM 9.0 8.7* 8.3*   MG  --  1.60  --    ALBUMIN 3.0* 2.7* 2.7*   ALKPHOS 183* 155* 147   ALT 53 49 50   * 138* 132*   BILITOT 2.1* 1.9* 1.5        Microbiology Results (last 7 days)       ** No results found for the last 168 hours. **             Nuclear Stress - Cardiology Interpreted    Normal myocardial perfusion scan. There is no evidence of myocardial   ischemia or infarction.    The ECG portion of the study is abnormal but not diagnostic.         Medication List        START taking these medications      folic acid 1 MG tablet  Commonly known as: FOLVITE  Take 1 tablet (1 mg total) by mouth once daily.            CHANGE how you take these medications      metoprolol succinate 100 MG 24 hr tablet  Commonly known as: TOPROL-XL  Take 1 tablet (100 mg total) by mouth once daily.  What changed:   medication strength  how much to take            CONTINUE taking these medications      amLODIPine 10 MG tablet  Commonly known as: NORVASC  Take 1 tablet (10 mg total) by mouth once daily.     aspirin 81 MG EC tablet  Commonly known as: ECOTRIN     rosuvastatin 20 MG tablet  Commonly known as: CRESTOR  Take 1 tablet (20 mg total) by mouth once daily.     valsartan 320 MG tablet  Commonly known as: DIOVAN  Take 1 tablet (320 mg total) by mouth once daily.            STOP taking these medications      cyclobenzaprine 5 MG tablet  Commonly known as: FLEXERIL     meloxicam 15 MG tablet  Commonly known as: MOBIC               Where to Get Your Medications        These medications were sent to [facility] - [place], [place] - [address]  [address], [facility] [address]      Phone: [phone]   metoprolol succinate 100 MG 24 hr tablet       Information about where to get these medications is not yet available    Ask your nurse or doctor about these  medications  folic acid 1 MG tablet

## 2025-01-08 NOTE — NURSING
"Morning medications not given today due to patient taking home medications that were in the room - confirmed that he took blood pressure medications and ASA; offered to give scheduled metoprolol but pt refused stating "I'm not taking that, I have some at home and I'll just wait to take it when I get home. My heart rate has been fine, I have been watching it all night.They need to let me go home." Educated patient on medication compliance and only taking given medications dispensed by the nursing staff for safety and proper documentation. Pt verbalized understanding but still requesting to be discharged home at this time.  "

## 2025-01-09 ENCOUNTER — PATIENT OUTREACH (OUTPATIENT)
Dept: ADMINISTRATIVE | Facility: CLINIC | Age: 70
End: 2025-01-09
Payer: MEDICARE

## 2025-01-09 NOTE — PROGRESS NOTES
C3 nurse attempted to contact Raymon Causey for a TCC post hospital discharge follow up call. No answer. Left voicemail with callback information. The patient does not have a scheduled HOSFU appointment. Message sent to PCP staff for assistance with scheduling visit with patient.

## 2025-01-10 NOTE — PROGRESS NOTES
C3 nurse spoke with Raymon Causey and wife for a TCC post hospital discharge follow up call. The patient stated has a scheduled HOSFU appointment with BRETT Diop Jr., MD on 1/15/25.

## 2025-03-29 NOTE — Clinical Note
The catheter was inserted into the and was inserted over the wire into the left ventricle. Hemodynamics were performed.  and Pullback was recorded.  An angiography was performed of the LV.  Pigtail. EF- 50%

## 2025-03-29 NOTE — Clinical Note
The catheter was inserted into the and was inserted over the wire into the left subclavian artery. Hemodynamics were performed.  An angiography was performed of the LIMAEllis CHOW

## 2025-03-29 NOTE — Clinical Note
The catheter was inserted into the and was inserted over the wire into the ostium   left main. Hemodynamics were performed.  An angiography was performed of the left coronary arteries. Multiple views were taken. The angiography was performed via power injection.   [id number]

## 2025-03-29 NOTE — Clinical Note
The catheter was inserted into the and was inserted over the wire into the ostium   right coronary artery. Hemodynamics were performed.  An angiography was performed of the right coronary arteries. The angiography was performed via power injection.   GERALDO

## 2025-03-29 NOTE — Clinical Note
The catheter was inserted into the and was inserted over the wire into the left subclavian artery. Hemodynamics were performed.  An angiography was performed of the LIMA. The angiography was performed via power injection.

## 2025-03-29 NOTE — Clinical Note
The catheter was repositioned into the aorta. Hemodynamics were performed.  An angiography was performed of the aorta.  Pigtail. Abd AO with bilateral iliacs/femorals

## 2025-03-29 NOTE — Clinical Note
The sheath was inserted into the right femoral artery.  Sheath inserted proximal to existing arterial line.
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
## 2025-03-30 NOTE — H&P
[facility] - 7th Floor ICU  Pulmonary Critical Care Note    Patient Name: Deangelo Miller  MRN: [id number]  Admission Date: 3/29/2025  Hospital Length of Stay: 1 days  Code Status: No Order  Attending Provider: No att. providers found  Primary Care Provider: No primary care provider on file.     Subjective:     HPI: Raymon Causey is a 71 yo male with PMH of HTN and HLD who was found to be in cardiac arrest with PEA outside the hospital. EMS arrived where ACLS was initiated with multiple rounds of compressions and medications were administered with eventual achievement of ROSC after 45 minutes. Patient was brought to [facility] ED for further evaluation. Pan CT scan was performed including CT head without acute abnormalities noted. EKG was obtained which was read as STEMI and code STEMI was activated. Interventional Cardiology took patient to the cath lab for LHC where he was found to have LAD 80% occlusion along with moderate diffuse disease of his Left Main and RCA. After the procedure, patient had 2 episodes of Vtach while still in the cath lab where patient was shocked twice with resolution of Vtach. Patient was noted to have a H/H 6.5/19.2 and started on blood transfusion. Patient was then admitted to the ICU for further management.      Hospital Course/Significant events:  03/30/25: Admitted to ICU after outside cardiac arrest; STEMI activated with LHC performed without STEMI found    24 Hour Interval History:  N/A    No past medical history on file.    No past surgical history on file.    Social History[1]        No current outpatient medications    Review of patient's allergies indicates:  Not on File     Current Inpatient Medications   fentaNYL        mupirocin   Nasal BID    NORepinephrine 8 mg        sodium bicarbonate 150 mEq   Intravenous ED 1 Time    sodium bicarbonate  100 mEq Intravenous Once       Current Intravenous Infusions   0.9% NaCl   Intravenous Continuous        amiodarone in  dextrose 5%    Continuous PRN 33.3 mL/hr at 03/30/25 0019 1 mg/min at 03/30/25 0019    heparin (porcine) in D5W  0-40 Units/kg/hr Intravenous Continuous 12 mL/hr at 03/29/25 2253 12 Units/kg/hr at 03/29/25 2253    NORepinephrine bitartrate-D5W  0-3 mcg/kg/min Intravenous Continuous        propofoL  0-50 mcg/kg/min Intravenous Continuous        sodium bicarbonate 150 mEq in D5W 1,000 mL infusion   Intravenous Continuous             Review of Systems   Unable to perform ROS: Intubated          Objective:     No intake or output data in the 24 hours ending 03/30/25 0142      Vital Signs (Most Recent):  Temp: 97.9 °F (36.6 °C) (03/29/25 2214)  Pulse: (!) 142 (03/29/25 2310)  Resp: 20 (03/29/25 2310)  BP: (!) 148/76 (03/29/25 2240)  SpO2: 100 % (03/29/25 2310)  Body mass index is 31.63 kg/m².  Weight: 100 kg (220 lb 7.4 oz) Vital Signs (24h Range):  Temp:  [97.9 °F (36.6 °C)] 97.9 °F (36.6 °C)  Pulse:  [] 142  Resp:  [20-24] 20  SpO2:  [98 %-100 %] 100 %  BP: ()/() 148/76  Arterial Line BP: (109-130)/(45-52) 130/51     Physical Exam  Constitutional:       Appearance: He is obese. He is ill-appearing.      Comments: Mechanically intubated   HENT:      Head: Normocephalic.      Mouth/Throat:      Mouth: Mucous membranes are moist.   Eyes:      Comments: 2 mm pupil bilaterally; pinpoint   Cardiovascular:      Rate and Rhythm: Regular rhythm. Tachycardia present.      Pulses: Normal pulses.   Pulmonary:      Comments: Mechanically intubated  Abdominal:      General: Bowel sounds are normal. There is no distension.      Palpations: Abdomen is soft.   Skin:     General: Skin is warm.      Capillary Refill: Capillary refill takes less than 2 seconds.   Neurological:      Comments: Difficult to assess s/p cardiac arrest, intubation and sedation; not following commands; pinpoint pupils mildly reactive to light           Lines/Drains/Airways       Central Venous Catheter Line  Duration             Percutaneous  Central Line - Triple Lumen  -- days    Percutaneous Central Line - Triple Lumen  03/29/25 2204 Internal Jugular Right <1 day              Drain  Duration                  NG/OG Tube 03/29/25 2201 18 Fr. Center mouth <1 day              Airway  Duration                  Airway - Non-Surgical 03/29/25 2152 <1 day              Arterial Line  Duration             Arterial Line 03/29/25 2200 Right Femoral <1 day              Intraosseous Line  Duration                  Intraosseous Line 03/29/25 Tibia 1 day              Peripheral Intravenous Line  Duration                  Peripheral IV - Single Lumen 03/29/25 2157 18 G Left;Posterior Hand <1 day         Sheath 03/29/25 2330 Right <1 day         Sheath 03/29/25 2356 Right <1 day                    Significant Labs:    Lab Results   Component Value Date    WBC 13.11 (H) 03/29/2025    HGB 6.5 (L) 03/29/2025    HCT 19.2 (LL) 03/29/2025    MCV 66.9 (L) 03/29/2025     03/29/2025           BMP  Lab Results   Component Value Date     03/29/2025    K 3.7 03/29/2025    CO2 10 (L) 03/29/2025    BUN 6.8 (L) 03/29/2025    CREATININE 1.15 03/29/2025    CALCIUM 9.1 03/29/2025    AGAP 24.0 03/29/2025         ABG  Recent Labs   Lab 03/30/25  0129   PH 7.200*   PO2 166.0*   PCO2 29.0*   HCO3 11.3*   POCBASEDEF -15.40       Mechanical Ventilation Support:  Vent Mode: A/C (03/29/25 2256)  Ventilator Initiated: Yes (03/29/25 2155)  Set Rate: 20 BPM (03/29/25 2256)  Vt Set: 480 mL (03/29/25 2256)  PEEP/CPAP: 5 cmH20 (03/29/25 2256)  Oxygen Concentration (%): 40 (03/29/25 2310)  Peak Airway Pressure: 18 cmH20 (03/29/25 2256)  Total Ve: 13.2 L/m (03/29/25 2256)      Significant Imaging:  I have reviewed the pertinent imaging within the past 24 hours.        Assessment/Plan:     Assessment  Outside cardiac arrest  Vtach s/p defibrillation with resolution  Microcytic anemia  Lactic acidosis  Anion gap metabolic acidosis due to above  CAD s/p LHC w/ LAD lesion 80% occlusion  Hx of  HTN and HLD      Plan  Admitted to the ICU for close monitoring  Currently mechanically ventilated; continue at this time  Currently on Levophed for vasopressor support; wean as tolerated to maintain MAP > 65  Currently on Propofol for sedation; continue at this time  Currently on Amiodarone gtt due to 2 episodes of Vtach after [facility] s/p 2 shocks  Start on hypothermia protocol for cardiac arrest with goal temperature 36 degrees celcius  Interventional Cardiology performed [facility] with noted LAD lesion 80% with moderate diffuse disease in the Left Main and RCA  Planning on CABG vs staged PCI once patient more stable  H/H 6.5/19.2 on admission; prior charting shows H/H 8.8/26.9 on 2/2025; noted to have slow oozing blood from rectum  Prior colonoscopy in 2023 with prior history of adenomatous polyps  Currently transfusing 2 units pRBC and FFP; will monitor H/H q6hr and transfuse for hemoglobin < 7  Consider GI consult in the AM if patient's neuro exam improves    DVT Prophylaxis: SCDs  GI Prophylaxis: N/A     32 minutes of critical care was time spent personally by me on the following activities: development of treatment plan with patient or surrogate and bedside caregivers, discussions with consultants, evaluation of patient's response to treatment, examination of patient, ordering and performing treatments and interventions, ordering and review of laboratory studies, ordering and review of radiographic studies, pulse oximetry, re-evaluation of patient's condition.  This critical care time did not overlap with that of any other provider or involve time for any procedures.     Tray Flores MD  Pulmonary Critical Care Medicine  [facility] - 7th Floor ICU  DOS: 03/30/2025          [1]

## 2025-03-30 NOTE — PLAN OF CARE
Problem: Infection  Goal: Absence of Infection Signs and Symptoms  Outcome: Progressing     Problem: Adult Inpatient Plan of Care  Goal: Plan of Care Review  Outcome: Progressing  Goal: Patient-Specific Goal (Individualized)  Outcome: Progressing     Problem: Skin Injury Risk Increased  Goal: Skin Health and Integrity  Outcome: Progressing

## 2025-03-30 NOTE — ED NOTES
Initial [facility] referral placed due to clinical triggers, referral number: [id number].
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
## 2025-03-30 NOTE — PROCEDURES
[facility]   Central Venous Catheter  Procedure Note    SUMMARY     Date of Procedure: 3/30/2025    Procedure: Insertion of Central Venous Catheter    Perfomed by: LATOSHA To MD    Assisting Provider:  None    Indications: vascular access     Pre-Procedure Diagnosis:  STEMI    Post-Procedure Diagnosis:  Same    Anesthesia: local, 1% lidocaine 5 ml     Technical Procedures Used: Seldinger, ultrasound guided    Description of the Findings of the Procedure:    Informed consent was obtained for the procedure, including sedation.  Risks of lung perforation, hemorrhage, arrhythmia, and adverse drug reaction were discussed.     Maximum sterile technique was used including antiseptics, cap, gloves, gown, hand hygiene, mask, and sheet.    Under sterile conditions the skin above the at base of throat, on the left internal jugular vein was prepped with betadine and covered with a sterile drape. Local anesthesia was applied to the skin and subcutaneous tissues. A 22-gauge needle was used to identify the vein. An 18-gauge needle was then inserted into the vein. A guide wire was then passed easily through the catheter.  Confirmation of the guidewire in the left IJ was noted with ultrasound.  There were no arrhythmias. The catheter was then withdrawn. A 7.0 Urdu triple-lumen was then inserted into the vessel over the guide wire.  Excellent blood returned from all 3 ports.  The catheter was sutured into place.    There were no changes to vital signs. Catheter was flushed with 10 cc NS. Patient did tolerate procedure well.    Recommendations:    Chest x-ray confirmed excellent placement of the catheter in the superior vena cava.  No pneumothorax noted.    Significant Surgical Tasks Conducted by the Assistant(s), if Applicable:  None    Complications: No    Estimated Blood Loss (EBL): None    Condition: Critical    Disposition:  Patient is in ICU in critical condition.

## 2025-03-30 NOTE — ED NOTES
Pt. logrolled to R side to inspect posterior surfaces - no injuries or step-off identified, decreased rectal tone.

## 2025-03-30 NOTE — BRIEF OP NOTE
[facility] - Cath Lab Services  Brief Operative Note    SUMMARY     Surgery Date: 3/29/2025     Surgeons and Role:     * Escobar Ashley Jr., MD - Primary    Assisting Surgeon: None    Pre-op Diagnosis:  ST elevation myocardial infarction (STEMI), unspecified artery [I21.3]    Post-op Diagnosis:  Post-Op Diagnosis Codes:     * ST elevation myocardial infarction (STEMI), unspecified artery [I21.3]    Procedure(s) (LRB):  Angiogram, Coronary, with Left Heart Cath (N/A)  INSERTION, CATHETER, RIGHT HEART (Right)    Anesthesia: RN IV Sedation    Implants:  * No implants in log *    Operative Findings: coronary artery disease; severe anemia; most likely demand ischemia; normal to elevated cardiac output and index    Estimated Blood Loss: * No values recorded between 3/29/2025 12:00 AM and 3/30/2025 12:24 AM *    Estimated Blood Loss has been documented.         Specimens:   Specimen (24h ago, onward)      None          * No specimens in log *    [id number]

## 2025-03-31 NOTE — PROGRESS NOTES
Inpatient Nutrition Assessment    Admit Date: 3/29/2025   Total duration of encounter: 2 days   Patient Age: 70 y.o.    Nutrition Recommendation/Prescription     Start tube feeding at 25 ml/hr, advance by 20 ml/hr every 4 hours as tolerated:  Peptamen Intense VHP goal rate 85 ml/hr to provide  1700 kcal, 100% needs  157 g protein, 104% needs  1428 ml free water, 57% needs  calculations based on estimated 20 hour run time      Communication of Recommendations: reviewed with provider and reviewed with nurse    Nutrition Assessment     Malnutrition Assessment/Nutrition-Focused Physical Exam       Malnutrition Level: other (see comments) (Does not meet criteria) (03/31/25 1138)  Energy Intake (Malnutrition): other (see comments) (Unable to assess) (03/31/25 1138)  Weight Loss (Malnutrition): other (see comments) (Unable to assess) (03/31/25 1138)                                                  A minimum of two characteristics is recommended for diagnosis of either severe or non-severe malnutrition.    Chart Review    Reason Seen: continuous nutrition monitoring and malnutrition screening tool (MST)    Malnutrition Screening Tool Results   Have you recently lost weight without trying?: Yes: 24-33 lbs  Have you been eating poorly because of a decreased appetite?: Yes   MST Score: 4   Diagnosis:  Out of hospital cardiac arrest with VT rhythm s/p defibrillation with chronic CAD on [facility] 3/30  Undifferentiated shock  Lactic acidosis  Anion gap metabolic acidosis due to above  CAD s/p [facility] w/ LAD lesion 80% occlusion  Hx of HTN and HLD    Scheduled Medications:  ceFEPime IV (PEDS and ADULTS), 1 g, Q12H  hydrocortisone sodium succinate, 100 mg, Q8H  mupirocin, , BID  pantoprazole, 40 mg, BID  vancomycin (VANCOCIN) 1,000 mg in D5W 250 mL IVPB (admixture device), 1,000 mg, Once    Continuous Infusions:  amiodarone in dextrose 5%, Last Rate: 1 mg/min (03/30/25 0019)  amiodarone in dextrose 5%, Last Rate: 0.5 mg/min (03/31/25  0957)  fentanyl  heparin (porcine) in D5W, Last Rate: Stopped (03/29/25 9050)  NORepinephrine bitartrate-D5W, Last Rate: 0.22 mcg/kg/min (03/31/25 0957)  propofoL, Last Rate: Stopped (03/31/25 0843)  vasopressin, Last Rate: 0.04 Units/min (03/31/25 0957)    PRN Medications:   acetaminophen, 650 mg, Q4H PRN  amiodarone in dextrose 5%, , Continuous PRN  calcium gluconate IVPB, 1 g, PRN  calcium gluconate IVPB, 2 g, PRN  calcium gluconate IVPB, 3 g, PRN  dextrose 50%, 12.5 g, PRN  fentaNYL, 50 mcg, Q1H PRN  glucagon (human recombinant), 1 mg, PRN  heparin (PORCINE), 40 Units/kg, PRN  heparin (PORCINE), 30 Units/kg, PRN  hydrALAZINE, 10 mg, Q4H PRN  HYDROcodone-acetaminophen, 1 tablet, Q4H PRN  insulin aspart U-100, 0-10 Units, Q6H PRN  magnesium sulfate 2 g IVPB, 2 g, PRN  magnesium sulfate 2 g IVPB, 2 g, PRN  midazolam, , PRN  morphine, 2 mg, Q4H PRN  ondansetron, 8 mg, Q8H PRN  potassium chloride in water, 40 mEq, PRN   And  potassium chloride in water, 60 mEq, PRN   And  potassium chloride in water, 80 mEq, PRN  sodium chloride 0.9%, 10 mL, PRN  sodium phosphate 15 mmol in D5W 250 mL IVPB, 15 mmol, PRN  sodium phosphate 20.1 mmol in D5W 250 mL IVPB, 20.1 mmol, PRN  sodium phosphate 30 mmol in D5W 250 mL IVPB, 30 mmol, PRN  vancomycin - pharmacy to dose, , pharmacy to manage frequency    Calorie Containing IV Medications: no significant kcals from medications at this time    Recent Labs   Lab 03/29/25  2257 03/30/25  0136 03/30/25  0257 03/30/25  0342 03/30/25  1024 03/30/25  1158 03/30/25  1553 03/30/25  2021 03/31/25  0402 03/31/25  0820    134*  --   --   --  140  --  136 134*  --    K 3.7 3.9  --   --   --  3.7  --  4.0 3.5  --    CALCIUM 9.1 8.7  --   --   --  7.9*  --  7.6* 7.4*  --    PHOS  --  8.3*  --   --   --  6.0*  --  5.3* 4.5  --    MG 1.80 1.70  --   --   --  1.40*  --  2.30 2.30  --     100  --   --   --  100  --  97* 96*  --    CO2 10* 10*  --   --   --  17*  --  25 31  --    BUN 6.8* 9.8  " --   --   --  13.6  --  15.2 16.4  --    CREATININE 1.15 1.39*  --   --   --  1.57*  --  1.45* 1.37*  --    EGFRNORACEVR >60 >60  --   --   --  47  --  52 56  --    GLUCOSE 213* 262*  255* 242* 247*  --  211*  --  229* 199*  --    BILITOT 0.6  --   --   --   --   --   --   --  1.5  --    ALKPHOS 108  --   --   --   --   --   --   --  83  --    ALT 73*  --   --   --   --   --   --   --  222*  --    *  --   --   --   --   --   --   --  792*  --    ALBUMIN 2.6*  --   --   --   --   --   --   --  2.4*  --    LIPASE 72*  --   --   --   --   --   --   --   --   --    WBC 13.11* 18.53*  --   --   --   --   --   --  38.38  38.38*  --    HGB 6.5* 8.4*  --  9.7* 9.9*  --  9.9* 9.9* 8.8* 8.7*   HCT 19.2* 25.1*  --  29.4* 27.6*  --  27.8* 27.8* 24.3* 23.9*     Nutrition Orders:  No diet orders on file      Appetite/Oral Intake: NPO/not applicable  Factors Affecting Nutritional Intake: on mechanical ventilation  Social Needs Impacting Access to Food: unable to assess at this time; will attempt on follow-up  Food/Confucianism/Cultural Preferences: unable to obtain  Food Allergies: unable to obtain  Last Bowel Movement: 03/31/25  Wound(s): no pressure injuries documented at this time     Comments    3/31/25 Patient on mechanical ventilation, ok to start tube feeding, will order.    Anthropometrics    Height: 5' 10" (177.8 cm), Height Method: Estimated  Last Weight: 124 kg (273 lb 5.9 oz) (03/31/25 1132), Weight Method: Bed Scale  BMI (Calculated): 39.2  BMI Classification: obese grade II (BMI 35-39.9)        Ideal Body Weight (IBW), Male: 166 lb     % Ideal Body Weight, Male (lb): 132.81 %                          Usual Weight Provided By: unable to obtain usual weight    Wt Readings from Last 5 Encounters:   03/31/25 124 kg (273 lb 5.9 oz)     Weight Change(s) Since Admission:   (3/31) recent admission, admission weight 100 kg estimated, took bed weight 124 kg during rounds  Wt Readings from Last 1 Encounters:   03/31/25 " 1132 124 kg (273 lb 5.9 oz)   03/29/25 2158 100 kg (220 lb 7.4 oz)   Admit Weight: 100 kg (220 lb 7.4 oz) (03/29/25 2158), Weight Method: Estimated    Estimated Needs    Weight Used For Calorie Calculations: 124 kg (273 lb 5.9 oz)  Energy Calorie Requirements (kcal): 1345-9961, 11-14 kcal/kg     Weight Used For Protein Calculations: 75.3 kg (166 lb)  Protein Requirements: 151 g, 2 g/kg  Fluid Requirements (mL): 2480, 20 ml/kg  CHO Requirement: N/A     Enteral Nutrition Patient not receiving enteral nutrition at this time.    Parenteral Nutrition Patient not receiving parenteral nutrition support at this time.    Evaluation of Received Nutrient Intake    Calories: not meeting estimated needs  Protein: not meeting estimated needs    Patient Education Not applicable.    Nutrition Diagnosis     PES: Inadequate energy intake related to inability to consume sufficient nutrients as evidenced by less than 80% needs met. (active)    PES: N/A           Nutrition Interventions     Intervention(s): modified composition of enteral nutrition, modified rate of enteral nutrition, and collaboration with other providers  Intervention(s): N/A      Goal: Meet greater than 80% of nutritional needs by follow-up. (new)  Goal: Tolerate enteral feeding at goal rate by follow-up. (new)    Nutrition Goals & Monitoring     Dietitian will monitor: food and beverage intake, energy intake, enteral nutrition intake, parenteral nutrition intake, weight, electrolyte/renal panel, beliefs/attitudes, glucose/endocrine profile, and gastrointestinal profile  Discharge planning: too early to determine; pending clinical course  Nutrition Risk/Follow-Up: high (follow-up in 1-4 days)   Please consult if re-assessment needed sooner.

## 2025-03-31 NOTE — PROGRESS NOTES
Dr. Catherine at bedside to do tube exchange. Patient  balloon stopped working and patient had significant leak from ETT tube. Patient return Tidal Volumes dropped into the 250 ml range.  Patient tolerated tube exchange well. Tube resecured at 23 at the lips with new 7.5 ET tube. Returned patient to charted vent settings.
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
## 2025-03-31 NOTE — PLAN OF CARE
Problem: Infection  Goal: Absence of Infection Signs and Symptoms  Outcome: Progressing     Problem: Adult Inpatient Plan of Care  Goal: Plan of Care Review  Outcome: Progressing  Goal: Patient-Specific Goal (Individualized)  Outcome: Progressing  Goal: Absence of Hospital-Acquired Illness or Injury  Outcome: Progressing  Goal: Optimal Comfort and Wellbeing  Outcome: Progressing  Goal: Readiness for Transition of Care  Outcome: Progressing     Problem: Skin Injury Risk Increased  Goal: Skin Health and Integrity  Outcome: Progressing     Problem: Delirium  Goal: Optimal Coping  Outcome: Progressing  Goal: Improved Behavioral Control  Outcome: Progressing  Goal: Improved Attention and Thought Clarity  Outcome: Progressing  Goal: Improved Sleep  Outcome: Progressing     Problem: Fall Injury Risk  Goal: Absence of Fall and Fall-Related Injury  Outcome: Progressing     Problem: Wound  Goal: Optimal Coping  Outcome: Progressing  Goal: Optimal Functional Ability  Outcome: Progressing  Goal: Absence of Infection Signs and Symptoms  Outcome: Progressing  Goal: Improved Oral Intake  Outcome: Progressing  Goal: Optimal Pain Control and Function  Outcome: Progressing  Goal: Skin Health and Integrity  Outcome: Progressing  Goal: Optimal Wound Healing  Outcome: Progressing
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
## 2025-03-31 NOTE — CONSULTS
03/31 Spiritual care consult,  anointed unresponsive Pt. Offered prayer and encouragement to family.  Will have Pastoral care follow up.  Fr. YOANNA Shelton

## 2025-03-31 NOTE — PROGRESS NOTES
[facility] - 7th Floor ICU  Pulmonary Critical Care Note    Patient Name: Raymon Causey  MRN: [id number]  Admission Date: 3/29/2025  Hospital Length of Stay: 2 days  Code Status: Full Code  Attending Provider: REYNA To MD  Primary Care Provider: No primary care provider on file.     Subjective:     HPI: Raymon Causey is a 69 yo male with PMH of HTN and HLD who was found to be in cardiac arrest with PEA outside the hospital. EMS arrived where ACLS was initiated with multiple rounds of compressions and medications were administered with eventual achievement of ROSC after 45 minutes. Patient was brought to [facility] ED for further evaluation. Pan CT scan was performed including CT head without acute abnormalities noted. EKG was obtained which was read as STEMI and code STEMI was activated. Interventional Cardiology took patient to the cath lab for [facility] where he was found to have LAD 80% occlusion along with moderate diffuse disease of his Left Main and RCA. After the procedure, patient had 2 episodes of Vtach while still in the cath lab where patient was shocked twice with resolution of Vtach. Patient was noted to have a H/H 6.5/19.2 and started on blood transfusion. Patient was then admitted to the ICU for further management.      Hospital Course/Significant events:  03/30/25: Admitted to ICU after outside cardiac arrest; STEMI activated with [facility] showing chronic CAD.    24 Hour Interval History:  No events overnight.  Patient remains intubated and sedated on propofol.  Requiring Levophed and vasopressin for hypotension.  Remains on amiodarone infusion.  1.2 L of urine output yesterday.  White blood cell count up trending to 38,000.    No past medical history on file.    No past surgical history on file.    Social History[1]        Current Outpatient Medications   Medication Instructions    amLODIPine (NORVASC) 10 mg, Daily    metoprolol succinate (TOPROL-XL) 100 mg, Daily    rosuvastatin (CRESTOR)  20 mg, Nightly    valsartan (DIOVAN) 320 mg, Daily       Review of patient's allergies indicates:  Not on File     Current Inpatient Medications   hydrocortisone sodium succinate  100 mg Intravenous Q8H    mupirocin   Nasal BID    pantoprazole  40 mg Intravenous BID       Current Intravenous Infusions   amiodarone in dextrose 5%    Continuous PRN 33.3 mL/hr at 03/30/25 0019 1 mg/min at 03/30/25 0019    amiodarone in dextrose 5%  0.5 mg/min Intravenous Continuous 16.7 mL/hr at 03/31/25 0644 0.5 mg/min at 03/31/25 0644    fentanyl  0-250 mcg/hr Intravenous Continuous        heparin (porcine) in D5W  0-40 Units/kg/hr Intravenous Continuous   Stopped at 03/29/25 2324    NORepinephrine bitartrate-D5W  0-3 mcg/kg/min Intravenous Continuous 12.2 mL/hr at 03/31/25 0644 0.26 mcg/kg/min at 03/31/25 0644    propofoL  0-50 mcg/kg/min Intravenous Continuous 12 mL/hr at 03/31/25 0644 20 mcg/kg/min at 03/31/25 0644    sodium bicarbonate 150 mEq in D5W 1,000 mL infusion   Intravenous Continuous 125 mL/hr at 03/31/25 0644 Rate Verify at 03/31/25 0644    vasopressin  0.04 Units/min Intravenous Continuous 12 mL/hr at 03/31/25 0644 0.04 Units/min at 03/31/25 0644         Review of Systems   Unable to perform ROS: Intubated          Objective:       Intake/Output Summary (Last 24 hours) at 3/31/2025 0800  Last data filed at 3/31/2025 0644  Gross per 24 hour   Intake 46107.61 ml   Output 1195 ml   Net 9193.61 ml         Vital Signs (Most Recent):  Temp: 96.3 °F (35.7 °C) (03/31/25 0600)  Pulse: 87 (03/31/25 0600)  Resp: 20 (03/31/25 0600)  BP: 131/64 (03/31/25 0600)  SpO2: 97 % (03/31/25 0600)  Body mass index is 31.63 kg/m².  Weight: 100 kg (220 lb 7.4 oz) Vital Signs (24h Range):  Temp:  [94.8 °F (34.9 °C)-97 °F (36.1 °C)] 96.3 °F (35.7 °C)  Pulse:  [] 87  Resp:  [17-34] 20  SpO2:  [96 %-100 %] 97 %  BP: (105-161)/(57-79) 131/64  Arterial Line BP: ()/(42-75) 121/48     Physical Exam  Vitals reviewed.   Constitutional:        Comments: Arctic sun cooling device in place   Cardiovascular:      Rate and Rhythm: Normal rate and regular rhythm.   Pulmonary:      Effort: No respiratory distress.      Breath sounds: No wheezing or rales.      Comments: Synchronous with the ventilator  Abdominal:      General: Abdomen is flat.      Palpations: Abdomen is soft.   Musculoskeletal:      Right lower leg: No edema.      Left lower leg: No edema.   Neurological:      Comments: Intubated and sedated on propofol.  He does not withdraw to pain.  Pupils are nonreactive to light.           Lines/Drains/Airways       Central Venous Catheter Line  Duration             Percutaneous Central Line - Triple Lumen  03/30/25 0820 Internal Jugular Left <1 day              Drain  Duration                  NG/OG Tube 03/29/25 2201 18 Fr. Center mouth 1 day         Rectal Tube 03/30/25 0300 1 day         Urethral Catheter 03/30/25 0700 16 Fr. 1 day              Airway  Duration                  Airway - Non-Surgical 03/29/25 2152 1 day              Arterial Line  Duration             Arterial Line 03/30/25 0300 Left 1 day              Peripheral Intravenous Line  Duration                  Peripheral IV - Single Lumen 03/29/25 2157 18 G Left;Posterior Hand 1 day         Peripheral IV - Single Lumen 03/30/25 1339 20 G Anterior;Left Upper Arm <1 day                    Significant Labs:    Lab Results   Component Value Date    WBC 38.38 (H) 03/31/2025    WBC 38.38 03/31/2025    HGB 8.8 (L) 03/31/2025    HCT 24.3 (L) 03/31/2025    MCV 69.6 (L) 03/31/2025     03/31/2025           BMP  Lab Results   Component Value Date     (L) 03/31/2025    K 3.5 03/31/2025    CO2 31 03/31/2025    BUN 16.4 03/31/2025    CREATININE 1.37 (H) 03/31/2025    CALCIUM 7.4 (L) 03/31/2025    AGAP 7.0 03/31/2025         ABG  Recent Labs   Lab 03/30/25 0312   PH 7.250*   PO2 76.0*   PCO2 30.0*   HCO3 13.2*   POCBASEDEF -12.70       Mechanical Ventilation Support:  Vent Mode: A/C  (03/31/25 0517)  Ventilator Initiated: Yes (03/29/25 2155)  Set Rate: 20 BPM (03/31/25 0517)  Vt Set: 480 mL (03/31/25 0517)  PEEP/CPAP: 5 cmH20 (03/31/25 0517)  Oxygen Concentration (%): 40 (03/31/25 0517)  Peak Airway Pressure: 24 cmH20 (03/31/25 0517)  Total Ve: 7.7 L/m (03/31/25 0517)  F/VT Ratio<105 (RSBI): (!) 52.49 (03/31/25 0517)      Significant Imaging:  I have reviewed the pertinent imaging within the past 24 hours.        Assessment/Plan:     Assessment  Out of hospital cardiac arrest with VT rhythm s/p defibrillation with chronic CAD on [facility] 3/30  Undifferentiated shock  Lactic acidosis  Anion gap metabolic acidosis due to above  CAD s/p [facility] w/ LAD lesion 80% occlusion  Hx of HTN and HLD      Plan  Continue mechanical ventilation.  Decrease respiratory rate to 18.  RASS goal 0 to -1.   Start vancomycin and cefepime. Follow up cultures  Wean vasopressors as tolerated for MAP > 65 mmHg  Continue stress dose steroids  Continue cooling at 36 C for now  Stop bicarb drip  Repeat lactic acid  Trend CBC b.i.d.  Minimize sedation for best neuro exam  Continue amiodarone infusion  Appreciate cardiology recommendations.  Pursue CABG eval pending clinical improvement  Plastic surgery following for nasal fracture    DVT Prophylaxis: SCDs  GI Prophylaxis: Pantoprazole    Discussed plan of care with his wife at bedside.     This patient remains at high risk of decompensation and death and will remain in ICU level care.    35 minutes of critical care was time spent personally by me on the following activities: development of treatment plan with patient or surrogate and bedside caregivers, discussions with consultants, evaluation of patient's response to treatment, examination of patient, ordering and performing treatments and interventions, ordering and review of laboratory studies, ordering and review of radiographic studies, pulse oximetry, re-evaluation of patient's condition.  This critical care time did not overlap  with that of any other provider or involve time for any procedures.    This note was generated via [employer] and may contain some voice recognition errors.       Davion Ledezma MD  Pulmonary Critical Care Medicine  [facility] - 7th Floor ICU  DOS: 03/31/2025          [1]    Social Drivers of Health     Financial Resource Strain: Patient Unable To Answer (3/30/2025)    Overall Financial Resource Strain (CARDIA)     Difficulty of Paying Living Expenses: Patient unable to answer   Food Insecurity: Patient Unable To Answer (3/30/2025)    Hunger Vital Sign     Worried About Running Out of Food in the Last Year: Patient unable to answer     Ran Out of Food in the Last Year: Patient unable to answer   Transportation Needs: Patient Unable To Answer (3/30/2025)    PRAPARE - Transportation     Lack of Transportation (Medical): Patient unable to answer     Lack of Transportation (Non-Medical): Patient unable to answer   Stress: Patient Unable To Answer (3/30/2025)    [employer] - Occupational Stress Questionnaire     Feeling of Stress : Patient unable to answer   Housing Stability: Patient Unable To Answer (3/30/2025)    Housing Stability Vital Sign     Unable to Pay for Housing in the Last Year: Patient unable to answer     Homeless in the Last Year: Patient unable to answer

## 2025-03-31 NOTE — PLAN OF CARE
03/31/25 1516   Discharge Assessment   Assessment Type Discharge Planning Brief Assessment   Confirmed/corrected address, phone number and insurance Yes   Confirmed Demographics Correct on Facesheet   Source of Information family   Communicated DMITRIY with patient/caregiver Date not available/Unable to determine   Reason For Admission Cardiac Arrest   People in Home spouse   Facility Arrived From: none   Do you expect to return to your current living situation? Other (see comments)  (poor prognosis)   Prior to hospitilization cognitive status: Alert/Oriented   Current cognitive status: Coma/Sedated/Intubated   Walking or Climbing Stairs Difficulty no   Dressing/Bathing Difficulty no   Equipment Currently Used at Home none   Do you currently have service(s) that help you manage your care at home? No   Do you take prescription medications? Yes   Do you have prescription coverage? Yes   Coverage [employer]   Are you on dialysis? No   Do you take coumadin? No   Discharge Plan A Other  (TBD, intubated)   Discharge Plan B Other  (TBD, intubated)   DME Needed Upon Discharge  other (see comments)  (TBD)   Discharge Plan discussed with: Spouse/sig other   Name(s) and Number(s) Mary sawant   OTHER   Name(s) of People in Home Mary sawant and other family     Wife at bedside.  Patient was independent. 2 adult children, son and daughter.  No living will.  Poor prognosis, palliative on case

## 2025-03-31 NOTE — PROGRESS NOTES
Pharmacist Renal Dose Adjustment Note    Raymon Causey is a 70 y.o. male being treated with the medication cefepime    Patient Data:    Vital Signs (Most Recent):  Temp: 96.6 °F (35.9 °C) (03/31/25 1115)  Pulse: 87 (03/31/25 1115)  Resp: 18 (03/31/25 1115)  BP: 129/63 (03/31/25 1115)  SpO2: 95 % (03/31/25 1115) Vital Signs (72h Range):  Temp:  [93 °F (33.9 °C)-97.9 °F (36.6 °C)]   Pulse:  []   Resp:  [17-36]   BP: ()/()   SpO2:  [92 %-100 %]   Arterial Line BP: ()/(37-75)      Recent Labs   Lab 03/30/25  1158 03/30/25 2021 03/31/25  0402   CREATININE 1.57* 1.45* 1.37*     Serum creatinine: 1.37 mg/dL (H) 03/31/25 0402  Estimated creatinine clearance: 67.2 mL/min (A)    Medication:cefepime dose: 1g frequency q12h will be changed to medication:cefepime dose:2g frequency:q8h for CrCl > 60    Pharmacist's Name: Yeyo Adkins  Pharmacist's Extension: [phone]

## 2025-03-31 NOTE — PROGRESS NOTES
Pharmacokinetic Initial Assessment: IV Vancomycin    Assessment/Plan:    Initiate intravenous vancomycin with loading dose of 2000 mg once with subsequent doses when random concentrations are less than 20 mcg/mL  Desired empiric serum trough concentration is 15 to 20 mcg/mL  Draw vancomycin random level on 4/1 at 0430.  Pharmacy will continue to follow and monitor vancomycin.      Please contact pharmacy at extension [phone] with any questions regarding this assessment.     Thank you for the consult,   Yeyo Lucille       Patient brief summary:  Raymon Causey is a 70 y.o. male initiated on antimicrobial therapy with IV Vancomycin for treatment of suspected sepsis    Drug Allergies:   Review of patient's allergies indicates:  Not on File    Actual Body Weight:   100 kg    Renal Function:   Estimated Creatinine Clearance: 60.3 mL/min (A) (based on SCr of 1.37 mg/dL (H)).,     Dialysis Method (if applicable):  N/A    CBC (last 72 hours):  Recent Labs   Lab Result Units 03/29/25 2257 03/30/25  0136 03/30/25  0342 03/30/25  1024 03/30/25  1553 03/30/25 2021 03/31/25  0402   WBC x10(3)/mcL 13.11* 18.53*  --   --   --   --  38.38  38.38*   Hgb g/dL 6.5* 8.4* 9.7* 9.9* 9.9* 9.9* 8.8*   Hct % 19.2* 25.1* 29.4* 27.6* 27.8* 27.8* 24.3*   Platelet x10(3)/mcL 211 282  --   --   --   --  211   Mono % % 2.8 4.1  --   --   --   --   --    Monocytes % %  --   --   --   --   --   --  2   Eos % % 0.5 0.0  --   --   --   --   --    Basophil % % 0.3 0.1  --   --   --   --   --        Metabolic Panel (last 72 hours):  Recent Labs   Lab Result Units 03/29/25 2200 03/29/25 2250 03/29/25 2257 03/30/25  0129 03/30/25  0136 03/30/25  0257 03/30/25  0312 03/30/25  0342 03/30/25  0440 03/30/25  1158 03/30/25 2021 03/31/25  0402   Sodium mmol/L  --   --  136  --  134*  --   --   --   --  140 136 134*   Sodium, Blood Gas mmol/L 137 133*  --  131*  --   --  135*  --   --   --   --   --    Potassium mmol/L  --   --  3.7  --  3.9  --   --   --  "  --  3.7 4.0 3.5   Potassium, Blood Gas mmol/L 4.8 3.5  --  3.6  --   --  3.3*  --   --   --   --   --    Chloride mmol/L  --   --  102  --  100  --   --   --   --  100 97* 96*   CO2 mmol/L  --   --  10*  --  10*  --   --   --   --  17* 25 31   Glucose mg/dL  --   --  213*  --  262*  255* 242*  --  247*  --  211* 229* 199*   Glucose, UA   --   --   --   --   --   --   --   --  1+*  --   --   --    Blood Urea Nitrogen mg/dL  --   --  6.8*  --  9.8  --   --   --   --  13.6 15.2 16.4   Creatinine mg/dL  --   --  1.15  --  1.39*  --   --   --   --  1.57* 1.45* 1.37*   Albumin g/dL  --   --  2.6*  --   --   --   --   --   --   --   --  2.4*   Bilirubin Total mg/dL  --   --  0.6  --   --   --   --   --   --   --   --  1.5   ALP unit/L  --   --  108  --   --   --   --   --   --   --   --  83   AST unit/L  --   --  250*  --   --   --   --   --   --   --   --  792*   ALT unit/L  --   --  73*  --   --   --   --   --   --   --   --  222*   Magnesium Level mg/dL  --   --  1.80  --  1.70  --   --   --   --  1.40* 2.30 2.30   Phosphorus Level mg/dL  --   --   --   --  8.3*  --   --   --   --  6.0* 5.3* 4.5       Drug levels (last 3 results):  No results for input(s): "VANCOMYCINRA", "VANCORANDOM", "VANCOMYCINPE", "VANCOPEAK", "VANCOMYCINTR", "VANCOTROUGH" in the last 72 hours.    Microbiologic Results:  Microbiology Results (last 7 days)       Procedure Component Value Units Date/Time    Urine culture [[id number]] Collected: 03/30/25 0440    Order Status: Completed Specimen: Urine Updated: 03/31/25 0630     Urine Culture No Growth At 24 Hours    Blood Culture [[id number]]  (Normal) Collected: 03/30/25 0257    Order Status: Completed Specimen: Blood Updated: 03/31/25 0501     Blood Culture No Growth At 24 Hours    Blood Culture [[id number]]  (Normal) Collected: 03/30/25 0257    Order Status: Completed Specimen: Blood Updated: 03/31/25 0501     Blood Culture No Growth At 24 Hours            "

## 2025-03-31 NOTE — PLAN OF CARE
Problem: Adult Inpatient Plan of Care  Goal: Plan of Care Review  Outcome: Progressing  Goal: Patient-Specific Goal (Individualized)  Outcome: Progressing  Goal: Optimal Comfort and Wellbeing  Outcome: Progressing     Problem: Skin Injury Risk Increased  Goal: Skin Health and Integrity  Outcome: Progressing     Problem: Fall Injury Risk  Goal: Absence of Fall and Fall-Related Injury  Outcome: Progressing

## 2025-04-01 PROBLEM — I21.3 ST ELEVATION MYOCARDIAL INFARCTION (STEMI): Status: ACTIVE | Noted: 2025-01-01

## 2025-04-01 PROBLEM — I46.9 CARDIAC ARREST: Status: ACTIVE | Noted: 2025-01-01

## 2025-04-01 NOTE — PROGRESS NOTES
[facility] - 7th Floor ICU  Pulmonary Critical Care Note    Patient Name: Raymon Causey  MRN: [id number]  Admission Date: 3/29/2025  Hospital Length of Stay: 3 days  Code Status: DNR  Attending Provider: Davion Ledezma MD  Primary Care Provider: No primary care provider on file.     Subjective:     HPI: Raymon Causey is a 71 yo male with PMH of HTN and HLD who was found to be in cardiac arrest with PEA outside the hospital. EMS arrived where ACLS was initiated with multiple rounds of compressions and medications were administered with eventual achievement of ROSC after 45 minutes. Patient was brought to [facility] ED for further evaluation. Pan CT scan was performed including CT head without acute abnormalities noted. EKG was obtained which was read as STEMI and code STEMI was activated. Interventional Cardiology took patient to the cath lab for [facility] where he was found to have LAD 80% occlusion along with moderate diffuse disease of his Left Main and RCA. After the procedure, patient had 2 episodes of Vtach while still in the cath lab where patient was shocked twice with resolution of Vtach. Patient was noted to have a H/H 6.5/19.2 and started on blood transfusion. Patient was then admitted to the ICU for further management.      Hospital Course/Significant events:  03/30/25: Admitted to ICU after outside cardiac arrest; STEMI activated with [facility] showing chronic CAD.    24 Hour Interval History:  Amiodarone discontinued by Cardiology.  Family met with palliative Care yesterday and changed code status to DNR.  Vasopressors have been weaned but he remains on Levophed and vasopressin.  He remains intubated but off sedation.  White blood cell count decreasing.  1.5 L of urine output yesterday for net -286 mL.  Cultures remain no growth to date.    No past medical history on file.    Past Surgical History:   Procedure Laterality Date    ANGIOGRAM, CORONARY, WITH LEFT HEART CATHETERIZATION N/A 3/29/2025     Procedure: Angiogram, Coronary, with Left Heart Cath;  Surgeon: Escobar Ashley Jr., MD;  Location: [facility] CATH LAB;  Service: Cardiology;  Laterality: N/A;    RIGHT HEART CATHETERIZATION Right 3/29/2025    Procedure: INSERTION, CATHETER, RIGHT HEART;  Surgeon: Escobar Ashley Jr., MD;  Location: [facility] CATH LAB;  Service: Cardiology;  Laterality: Right;       Social History[1]        Current Outpatient Medications   Medication Instructions    amLODIPine (NORVASC) 10 mg, Daily    metoprolol succinate (TOPROL-XL) 100 mg, Daily    rosuvastatin (CRESTOR) 20 mg, Nightly    valsartan (DIOVAN) 320 mg, Daily       Review of patient's allergies indicates:  Not on File     Current Inpatient Medications   ceFEPime IV (PEDS and ADULTS)  2 g Intravenous Q8H    hydrocortisone sodium succinate  100 mg Intravenous Q8H    mupirocin   Nasal BID    pantoprazole  40 mg Intravenous BID       Current Intravenous Infusions   fentanyl  0-250 mcg/hr Intravenous Continuous        heparin (porcine) in D5W  0-40 Units/kg/hr Intravenous Continuous   Stopped at 03/29/25 2324    NORepinephrine bitartrate-D5W  0-3 mcg/kg/min Intravenous Continuous 5.6 mL/hr at 04/01/25 0116 0.12 mcg/kg/min at 04/01/25 0116    propofoL  0-50 mcg/kg/min Intravenous Continuous   Stopped at 03/31/25 0843    vasopressin  0.04 Units/min Intravenous Continuous 12 mL/hr at 04/01/25 0619 0.04 Units/min at 04/01/25 0619         Review of Systems   Unable to perform ROS: Intubated          Objective:       Intake/Output Summary (Last 24 hours) at 4/1/2025 0656  Last data filed at 4/1/2025 0600  Gross per 24 hour   Intake 1538.33 ml   Output 1975 ml   Net -436.67 ml         Vital Signs (Most Recent):  Temp: 97.7 °F (36.5 °C) (04/01/25 0545)  Pulse: 106 (04/01/25 0545)  Resp: 18 (04/01/25 0545)  BP: (!) 124/52 (04/01/25 0545)  SpO2: 96 % (04/01/25 0545)  Body mass index is 39.22 kg/m².  Weight: 124 kg (273 lb 5.9 oz) Vital Signs (24h Range):  Temp:  [96.1 °F (35.6 °C)-98.4 °F  (36.9 °C)] 97.7 °F (36.5 °C)  Pulse:  [] 106  Resp:  [0-20] 18  SpO2:  [94 %-98 %] 96 %  BP: ()/(48-96) 124/52  Arterial Line BP: ()/() 115/47     Physical Exam  Vitals reviewed.   Constitutional:       Comments: Arctic sun cooling device in place   Cardiovascular:      Rate and Rhythm: Normal rate and regular rhythm.   Pulmonary:      Effort: No respiratory distress.      Breath sounds: No wheezing or rales.      Comments: Synchronous with the ventilator  Abdominal:      General: Abdomen is flat.      Palpations: Abdomen is soft.   Musculoskeletal:      Right lower leg: No edema.      Left lower leg: No edema.   Neurological:      Comments: Intubated and off sedation for over 24 hours.  He does not withdraw to pain.  Pupils are nonreactive to light. He does not have a cough, gag or corneal reflexes. Sporadically initiates his own breaths.           Lines/Drains/Airways       Central Venous Catheter Line  Duration             Percutaneous Central Line - Triple Lumen  03/30/25 0820 Internal Jugular Left 1 day              Drain  Duration                  NG/OG Tube 03/29/25 2201 18 Fr. Center mouth 2 days         Rectal Tube 03/30/25 0300 2 days         Urethral Catheter 03/30/25 0700 16 Fr. 1 day              Airway  Duration                  Airway - Non-Surgical 03/29/25 2152 2 days              Arterial Line  Duration             Arterial Line 03/30/25 0300 Left 2 days              Peripheral Intravenous Line  Duration                  Peripheral IV - Single Lumen 03/29/25 2157 18 G Left;Posterior Hand 2 days         Peripheral IV - Single Lumen 03/30/25 1339 20 G Anterior;Left Upper Arm 1 day                    Significant Labs:    Lab Results   Component Value Date    WBC 26.83 (H) 04/01/2025    HGB 7.8 (L) 04/01/2025    HCT 22.1 (L) 04/01/2025    MCV 71.3 (L) 04/01/2025     04/01/2025           BMP  Lab Results   Component Value Date     04/01/2025    K 3.5 04/01/2025     CO2 30 04/01/2025    BUN 21.9 04/01/2025    CREATININE 1.32 (H) 04/01/2025    CALCIUM 7.3 (L) 04/01/2025    AGAP 9.0 04/01/2025         ABG  Recent Labs   Lab 04/01/25  0340   PH 7.520*   PO2 87.0   PCO2 44.0   HCO3 35.9*   POCBASEDEF 11.90*       Mechanical Ventilation Support:  Vent Mode: A/C (04/01/25 0600)  Ventilator Initiated: Yes (03/29/25 2155)  Set Rate: 18 BPM (04/01/25 0600)  Vt Set: 480 mL (04/01/25 0600)  PEEP/CPAP: 5 cmH20 (04/01/25 0600)  Oxygen Concentration (%): 30 (04/01/25 0600)  Peak Airway Pressure: 27 cmH20 (04/01/25 0600)  Total Ve: 6.8 L/m (04/01/25 0600)  F/VT Ratio<105 (RSBI): (!) 48.13 (03/31/25 2230)      Significant Imaging:  I have reviewed the pertinent imaging within the past 24 hours.        Assessment/Plan:     Assessment  Out of hospital cardiac arrest with VT rhythm s/p defibrillation with chronic CAD on [facility] 3/30  Undifferentiated shock  Lactic acidosis  Anion gap metabolic acidosis due to above  CAD s/p [facility] w/ LAD lesion 80% occlusion  Hx of HTN and HLD      Plan  Continue mechanical ventilation.  Decrease respiratory rate to 16.  Minimize sedation for best neuro exam  Continue vancomycin and cefepime. Follow up cultures  Check EEG  Wean vasopressors as tolerated for MAP > 65 mmHg  Continue stress dose steroids  Continue cooling at 36 C until 4 pm  Trend CBC b.i.d. Transfuse for Hb < 7  Appreciate cardiology recommendations.  Pursue CABG eval pending clinical improvement  Plastic surgery following for nasal fracture    DVT Prophylaxis: SCDs  GI Prophylaxis: Pantoprazole    Discussed plan of care with his wife at bedside.     This patient remains at high risk of decompensation and death and will remain in ICU level care.    33 minutes of critical care was time spent personally by me on the following activities: development of treatment plan with patient or surrogate and bedside caregivers, discussions with consultants, evaluation of patient's response to treatment, examination of  patient, ordering and performing treatments and interventions, ordering and review of laboratory studies, ordering and review of radiographic studies, pulse oximetry, re-evaluation of patient's condition.  This critical care time did not overlap with that of any other provider or involve time for any procedures.    This note was generated via [employer] and may contain some voice recognition errors.       Davion Ledezma MD  Pulmonary Critical Care Medicine  [facility] - 7th Floor ICU  DOS: 04/01/2025          [1]   Social History  Socioeconomic History    Marital status:      Social Drivers of Health     Financial Resource Strain: Patient Unable To Answer (3/31/2025)    Overall Financial Resource Strain (CARDIA)     Difficulty of Paying Living Expenses: Patient unable to answer   Food Insecurity: Patient Unable To Answer (3/31/2025)    Hunger Vital Sign     Worried About Running Out of Food in the Last Year: Patient unable to answer     Ran Out of Food in the Last Year: Patient unable to answer   Transportation Needs: Patient Unable To Answer (3/30/2025)    PRAPARE - Transportation     Lack of Transportation (Medical): Patient unable to answer     Lack of Transportation (Non-Medical): Patient unable to answer   Stress: Patient Unable To Answer (3/31/2025)    [employer] - Occupational Stress Questionnaire     Feeling of Stress : Patient unable to answer   Housing Stability: Patient Unable To Answer (3/31/2025)    Housing Stability Vital Sign     Unable to Pay for Housing in the Last Year: Patient unable to answer     Homeless in the Last Year: Patient unable to answer

## 2025-04-01 NOTE — PHYSICIAN QUERY
Please further specify the Coagulopathy diagnosis associated with the clinical findings outlined in the query:    Other hematological diagnosis (please specify): None

## 2025-04-01 NOTE — PROCEDURES
EEG    Dx: Cardiac arrest    Duration: 33 min    Technical: Standard digital EEG was performed at [facility]. The 10/20 international system of electrode placement was used.  Photic   stimulation and hyperventilation were not performed as activating   procedures.    Description: The posterior dominant rhythm was 0 Hz.  There   were no lateralizing features.  The tracing is essentially flat.  EKG artifact is present.  There were   no epileptiform discharges or clinical seizures seen.    Impression: Minimal low voltage tracing. Can be seen in cerebral anoxia, as a result of hypothermia protocol, or as a medication effect.  Please note than EEG is not a confirmatory test for brain death in adults.    Scheduled Meds:   ceFEPime IV (PEDS and ADULTS)  2 g Intravenous Q8H    hydrocortisone sodium succinate  100 mg Intravenous Q8H    mupirocin   Nasal BID    pantoprazole  40 mg Intravenous BID    vancomycin (VANCOCIN) 1,000 mg in 0.9% NaCl 250 mL IVPB (admixture device)  1,000 mg Intravenous Q12H     Continuous Infusions:   fentanyl  0-250 mcg/hr Intravenous Continuous        heparin (porcine) in D5W  0-40 Units/kg/hr Intravenous Continuous   Stopped at 25 2324    NORepinephrine bitartrate-D5W  0-3 mcg/kg/min Intravenous Continuous 5.6 mL/hr at 25 0116 0.12 mcg/kg/min at 25 0116    propofoL  0-50 mcg/kg/min Intravenous Continuous   Stopped at 25 0843    vasopressin  0.04 Units/min Intravenous Continuous 12 mL/hr at 25 1532 0.04 Units/min at 25 1532     PRN Meds:.  Current Facility-Administered Medications:     acetaminophen, 650 mg, Oral, Q4H PRN    calcium gluconate IVPB, 1 g, Intravenous, PRN    calcium gluconate IVPB, 2 g, Intravenous, PRN    calcium gluconate IVPB, 3 g, Intravenous, PRN    dextrose 50%, 12.5 g, Intravenous, PRN    [] fentaNYL, 50 mcg, Intravenous, Q15 Min PRN **FOLLOWED BY** fentaNYL, 50 mcg, Intravenous, Q1H PRN    fentaNYL, 50 mcg, Intravenous, Q1H PRN    glucagon  (human recombinant), 1 mg, Intramuscular, PRN    heparin (PORCINE), 40 Units/kg, Intravenous, PRN    heparin (PORCINE), 30 Units/kg, Intravenous, PRN    hydrALAZINE, 10 mg, Intravenous, Q4H PRN    HYDROcodone-acetaminophen, 1 tablet, Oral, Q4H PRN    insulin aspart U-100, 0-10 Units, Subcutaneous, Q6H PRN    magnesium sulfate 2 g IVPB, 2 g, Intravenous, PRN    magnesium sulfate 2 g IVPB, 2 g, Intravenous, PRN    midazolam, , , PRN    morphine, 2 mg, Intravenous, Q4H PRN    ondansetron, 8 mg, Oral, Q8H PRN    potassium chloride in water, 40 mEq, Intravenous, PRN **AND** potassium chloride in water, 60 mEq, Intravenous, PRN **AND** potassium chloride in water, 80 mEq, Intravenous, PRN    sodium chloride 0.9%, 10 mL, Intravenous, PRN    sodium phosphate 15 mmol in D5W 250 mL IVPB, 15 mmol, Intravenous, PRN    sodium phosphate 20.1 mmol in D5W 250 mL IVPB, 20.1 mmol, Intravenous, PRN    sodium phosphate 30 mmol in D5W 250 mL IVPB, 30 mmol, Intravenous, PRN    vancomycin - pharmacy to dose, , Intravenous, pharmacy to manage frequency

## 2025-04-01 NOTE — PROGRESS NOTES
Pharmacokinetic Assessment Follow Up: IV Vancomycin    Vancomycin serum concentration assessment(s):    The random level was drawn correctly and can be used to guide therapy at this time. The measurement is below the desired definitive target range of 15 to 20 mcg/mL.    Vancomycin Regimen Plan:    Change regimen to Vancomycin 1000 mg IV every 12 hours with next serum trough concentration measured at 0730 prior to 0830 dose on 04/02.    Scheduled Administration Times    0830  2030    Drug levels (last 3 results):  Recent Labs   Lab Result Units 04/01/25  0248   Vancomycin Random ug/ml 9.8*       Vancomycin Administrations:  vancomycin given in the last 96 hours                     vancomycin (VANCOCIN) 1,000 mg in 0.9% NaCl 250 mL IVPB (admixture device) (mg) 1,000 mg New Bag 04/01/25 0835    vancomycin (VANCOCIN) 1,000 mg in D5W 250 mL IVPB (admixture device) (mg) 1,000 mg New Bag 03/31/25 1125    vancomycin (VANCOCIN) 1,000 mg in D5W 250 mL IVPB (admixture device) (mg) 1,000 mg New Bag 03/31/25 0924                    Pharmacy will continue to follow and monitor vancomycin.    Please contact pharmacy at extension [phone] for questions regarding this assessment.    Thank you for the consult,   Stevie Peña       Patient brief summary:  Raymon Causey is a 70 y.o. male initiated on antimicrobial therapy with IV Vancomycin for treatment of sepsis    The patient's current regimen is 1000 mg every 12 hours.    Drug Allergies:   Review of patient's allergies indicates:  Not on File    Actual Body Weight:  Wt Readings from Last 1 Encounters:   03/31/25 124 kg (273 lb 5.9 oz)       Renal Function:   Estimated Creatinine Clearance: 69.8 mL/min (A) (based on SCr of 1.32 mg/dL (H)).    CBC (last 72 hours):  Recent Labs   Lab Result Units 03/29/25  2257 03/30/25  0136 03/30/25  0342 03/30/25  1024 03/30/25  1553 03/30/25  2021 03/31/25  0402 03/31/25  0820 04/01/25  0248 04/01/25  1226   WBC x10(3)/mcL 13.11* 18.53*  --   --   --    --  38.38  38.38*  --  26.83* 22.72*   Hgb g/dL 6.5* 8.4* 9.7* 9.9* 9.9* 9.9* 8.8* 8.7* 7.8* 7.3*   Hct % 19.2* 25.1* 29.4* 27.6* 27.8* 27.8* 24.3* 23.9* 22.1* 21.7*   Platelet x10(3)/mcL 211 282  --   --   --   --  211  --  189 177   Mono % % 2.8 4.1  --   --   --   --   --   --  6.5  --    Monocytes % %  --   --   --   --   --   --  2  --   --   --    Eos % % 0.5 0.0  --   --   --   --   --   --  0.0  --    Basophil % % 0.3 0.1  --   --   --   --   --   --  0.1  --        Metabolic Panel (last 72 hours):  Recent Labs   Lab Result Units 03/29/25  2200 03/29/25  2250 03/29/25  2257 03/30/25  0129 03/30/25  0136 03/30/25  0257 03/30/25  0312 03/30/25  0342 03/30/25  0440 03/30/25  1158 03/30/25  2021 03/31/25  0402 03/31/25  0845 04/01/25  0248 04/01/25  0340   Sodium mmol/L  --   --  136  --  134*  --   --   --   --  140 136 134*  --  138  --    Sodium, Blood Gas mmol/L 137 133*  --  131*  --   --  135*  --   --   --   --   --  134*  --  133*   Potassium mmol/L  --   --  3.7  --  3.9  --   --   --   --  3.7 4.0 3.5  --  3.5  --    Potassium, Blood Gas mmol/L 4.8 3.5  --  3.6  --   --  3.3*  --   --   --   --   --  3.5  --  3.3*   Chloride mmol/L  --   --  102  --  100  --   --   --   --  100 97* 96*  --  99  --    CO2 mmol/L  --   --  10*  --  10*  --   --   --   --  17* 25 31  --  30  --    Glucose mg/dL  --   --  213*  --  262*  255* 242*  --  247*  --  211* 229* 199*  --  167*  --    Glucose, UA   --   --   --   --   --   --   --   --  1+*  --   --   --   --   --   --    Blood Urea Nitrogen mg/dL  --   --  6.8*  --  9.8  --   --   --   --  13.6 15.2 16.4  --  21.9  --    Creatinine mg/dL  --   --  1.15  --  1.39*  --   --   --   --  1.57* 1.45* 1.37*  --  1.32*  --    Albumin g/dL  --   --  2.6*  --   --   --   --   --   --   --   --  2.4*  --  2.3*  --    Bilirubin Total mg/dL  --   --  0.6  --   --   --   --   --   --   --   --  1.5  --  2.2*  --    ALP unit/L  --   --  108  --   --   --   --   --   --   --    --  83  --  99  --    AST unit/L  --   --  250*  --   --   --   --   --   --   --   --  792*  --  823*  --    ALT unit/L  --   --  73*  --   --   --   --   --   --   --   --  222*  --  324*  --    Magnesium Level mg/dL  --   --  1.80  --  1.70  --   --   --   --  1.40* 2.30 2.30  --  1.90  --    Phosphorus Level mg/dL  --   --   --   --  8.3*  --   --   --   --  6.0* 5.3* 4.5  --  3.7  --        Microbiologic Results:  Microbiology Results (last 7 days)       Procedure Component Value Units Date/Time    Urine culture [[id number]] Collected: 03/30/25 0440    Order Status: Completed Specimen: Urine Updated: 04/01/25 0657     Urine Culture No Growth    Blood Culture [[id number]]  (Normal) Collected: 03/30/25 0257    Order Status: Completed Specimen: Blood Updated: 04/01/25 0414     Blood Culture No Growth At 48 Hours    Blood Culture [[id number]]  (Normal) Collected: 03/30/25 0257    Order Status: Completed Specimen: Blood Updated: 04/01/25 0414     Blood Culture No Growth At 48 Hours

## 2025-04-01 NOTE — PLAN OF CARE
Problem: Infection  Goal: Absence of Infection Signs and Symptoms  Outcome: Progressing     Problem: Adult Inpatient Plan of Care  Goal: Plan of Care Review  Outcome: Progressing  Goal: Patient-Specific Goal (Individualized)  Outcome: Progressing  Goal: Absence of Hospital-Acquired Illness or Injury  Outcome: Progressing  Goal: Optimal Comfort and Wellbeing  Outcome: Progressing  Goal: Readiness for Transition of Care  Outcome: Progressing     Problem: Skin Injury Risk Increased  Goal: Skin Health and Integrity  Outcome: Progressing     Problem: Delirium  Goal: Optimal Coping  Outcome: Progressing  Goal: Improved Behavioral Control  Outcome: Progressing  Goal: Improved Attention and Thought Clarity  Outcome: Progressing  Goal: Improved Sleep  Outcome: Progressing     Problem: Fall Injury Risk  Goal: Absence of Fall and Fall-Related Injury  Outcome: Progressing     Problem: Wound  Goal: Optimal Coping  Outcome: Progressing  Goal: Optimal Functional Ability  Outcome: Progressing  Goal: Absence of Infection Signs and Symptoms  Outcome: Progressing  Goal: Improved Oral Intake  Outcome: Progressing  Goal: Optimal Pain Control and Function  Outcome: Progressing  Goal: Skin Health and Integrity  Outcome: Progressing  Goal: Optimal Wound Healing  Outcome: Progressing     Problem: Coping Ineffective  Goal: Effective Coping  Outcome: Progressing

## 2025-04-01 NOTE — LOPA/MORA/SWTA/AOC/AEB
[facility] ([facility])  Notification of Referral  [facility] Contact # [phone]        Thank you for the referral of this patient to determine suitability for organ, tissue, and eye donation.  A chart review has been conducted (date):2025 at (time) 01:36 AM.    ? Potential candidate for organ donation - LONNIE following patient. Any changes in patients condition, discussion of withdrawing the vent or brain death exams, or family mention of donation immediately call [phone]. Refer all organ referrals within 1 hour of meeting the clinical triger of a patient with a neurological, anoxic, or life threatening injury and ONE of the following:    * GCS </= 8    * Loss of 2 or more brain stem reflexes    * Hypothermic Protocol Initiated    * Withdrawal of support discussion regardless of GCS    * Family mention of Donation    ? Potential for candidate for tissue and eye donation- call LONNIE at [phone] within 2 hours of death for screening as a potential tissue and/or eye donor.      ? Potential candidate for eye donation - call LONNIE at [phone] within 2 hours of death for screening as a potential eye donor.    ? NOT a candidate for organ/tissue/eye donation- call LONNIE at [phone] within 2 hours of death to report the time of death.    ? Potential candidate for donation/ Referral Closed- Any changes in patients condition, GCS of 5 or less, discussion of withdrawing the vent, brain death exams, or family mention of donation immediately call [phone].      Screened by: Jumana Rajan    [facility] Referral Number:  [id number]     Suitable for:  [x]Organ  [x] Tissue  [x] Eye  []Not suitable for Donation    Rule out Reason: N/A    Patient Name: Raymon Causey                   70 y.o. male  Patient MRN: [id number]  : 1955  DOD:  TOD:  Cause of Death: N/A    [x]Vented Patient  [facility] representative to approach family if appropriate  []DNR status obtained Referral of critical care  patients when family initiates Do Not Resuscitate  []Cardiac Death   Clinical Support Center to approach family via telephone if appropriate  []Donor Registry  Patient is listed in the Donor Registry    Completed by: Luh Rojas
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
## 2025-04-02 NOTE — PROGRESS NOTES
In room I have reviewed his medical records and hospital stay.  He has remained off of all sedation.      Examination:  -no movement to deep pain upper or lower extremities  -pupils equal round at 4 mm and nonreactive to light  -corneal reflex absent bilateral  -oculocephalic reflex absent  -oculovestibular reflex absent to cold calorics  -gag reflex absent  -cough reflex absent to deep endotracheal suctioning  -no respiratory effort in stimulation to apnea test, pretest PaCO2 48, posttest PaCO2 74, PO2 188.  No desaturation noted during study.    The above findings are consistent with brain death.  Time of death 1631 hrs on 04/02/2025.  Daughter is present at time of death declaration.

## 2025-04-02 NOTE — PROGRESS NOTES
Pharmacokinetic Assessment Follow Up: IV Vancomycin    Vancomycin serum concentration assessment(s):    The trough level was drawn correctly and can be used to guide therapy at this time. The measurement is below the desired definitive target range of 15 to 20 mcg/mL.    Vancomycin Regimen Plan:    Will continue current regimen for now since trough was after only 2 doses. Plan for the next trough prior to the fifth dose on 4/3 at 0730.    Scheduled Administration Times    0830  2030    Drug levels (last 3 results):  Recent Labs   Lab Result Units 04/01/25  0248 04/02/25  0745   Vancomycin Random ug/ml 9.8*  --    Vancomycin Trough ug/ml  --  11.9*       Vancomycin Administrations:  vancomycin given in the last 96 hours                     vancomycin (VANCOCIN) 1,000 mg in 0.9% NaCl 250 mL IVPB (admixture device) (mg) 1,000 mg New Bag 04/02/25 0845     1,000 mg New Bag 04/01/25 1947     1,000 mg New Bag  0835    vancomycin (VANCOCIN) 1,000 mg in D5W 250 mL IVPB (admixture device) (mg) 1,000 mg New Bag 03/31/25 1125    vancomycin (VANCOCIN) 1,000 mg in D5W 250 mL IVPB (admixture device) (mg) 1,000 mg New Bag 03/31/25 0924                    Pharmacy will continue to follow and monitor vancomycin.    Please contact pharmacy at extension [phone] for questions regarding this assessment.    Thank you for the consult,   Stevie Peña       Patient brief summary:  Raymon Causey is a 70 y.o. male initiated on antimicrobial therapy with IV Vancomycin for treatment of sepsis    The patient's current regimen is 1000 mg every 12 hours.    Drug Allergies:   Review of patient's allergies indicates:  Not on File    Actual Body Weight:  Wt Readings from Last 1 Encounters:   03/31/25 124 kg (273 lb 5.9 oz)       Renal Function:   Estimated Creatinine Clearance: 73.1 mL/min (A) (based on SCr of 1.26 mg/dL (H)).    CBC (last 72 hours):  Recent Labs   Lab Result Units 03/30/25  1553 03/30/25  2021 03/31/25  0402 03/31/25  0820  04/01/25  0248 04/01/25  1226 04/02/25  0415   WBC x10(3)/mcL  --   --  38.38  38.38*  --  26.83* 22.72* 27.26*   Hgb g/dL 9.9* 9.9* 8.8* 8.7* 7.8* 7.3* 7.3*   Hct % 27.8* 27.8* 24.3* 23.9* 22.1* 21.7* 21.2*   Platelet x10(3)/mcL  --   --  211  --  189 177 152   Mono % %  --   --   --   --  6.5  --  8.2   Monocytes % %  --   --  2  --   --   --   --    Eos % %  --   --   --   --  0.0  --  0.0   Basophil % %  --   --   --   --  0.1  --  0.2       Metabolic Panel (last 72 hours):  Recent Labs   Lab Result Units 03/30/25 2021 03/31/25  0402 03/31/25  0845 04/01/25  0248 04/01/25  0340 04/02/25  0415 04/02/25  0450   Sodium mmol/L 136 134*  --  138  --  140  --    Sodium, Blood Gas mmol/L  --   --  134*  --  133*  --  137   Potassium mmol/L 4.0 3.5  --  3.5  --  3.9  --    Potassium, Blood Gas mmol/L  --   --  3.5  --  3.3*  --  3.7   Chloride mmol/L 97* 96*  --  99  --  102  --    CO2 mmol/L 25 31 -- 30  --  32*  --    Glucose mg/dL 229* 199*  --  167*  --  170*  --    Blood Urea Nitrogen mg/dL 15.2 16.4  --  21.9  --  37.2*  --    Creatinine mg/dL 1.45* 1.37*  --  1.32*  --  1.26*  --    Albumin g/dL  --  2.4*  --  2.3*  --  2.3*  --    Bilirubin Total mg/dL  --  1.5  --  2.2*  --  1.8*  --    ALP unit/L  --  83  --  99  --  140  --    AST unit/L  --  792*  --  823*  --  442*  --    ALT unit/L  --  222*  --  324*  --  249*  --    Magnesium Level mg/dL 2.30 2.30  --  1.90  --  2.30  --    Phosphorus Level mg/dL 5.3* 4.5  --  3.7  --  2.2*  --        Microbiologic Results:  Microbiology Results (last 7 days)       Procedure Component Value Units Date/Time    Blood Culture [[id number]]  (Normal) Collected: 03/30/25 0257    Order Status: Completed Specimen: Blood Updated: 04/02/25 0412     Blood Culture No Growth At 72 Hours    Blood Culture [[id number]]  (Normal) Collected: 03/30/25 0257    Order Status: Completed Specimen: Blood Updated: 04/02/25 0412     Blood Culture No Growth At 72 Hours    Urine culture  [[id number]] Collected: 03/30/25 0440    Order Status: Completed Specimen: Urine Updated: 04/01/25 0657     Urine Culture No Growth

## 2025-04-02 NOTE — PROGRESS NOTES
Brain Death Determination Note    Neurologic exam  -GCS-3T Off all sedation for >24 hours  -No eye opening, facial grimace or organized movement to painful stimuli  -Absence of Pupillary response to light  -Absence of Ocular movement to oculocephalic testing (dolls eye)  -Absence of Ocular movement to oculovestibular testing (cold calorics)  -Absence of Corneal reflex  -Absence of Tracheal reflex (cough)  -Absence of Pharyngeal reflex (gag)    Apnea testing  - Awaiting ABG to determine if apnea testing can be done.    Davion Ledezma MD  Pulmonology/Critical Care  [facility] - 7th Floor ICU  4/2/2025  2:58 PM    Addendum:     Apnea test with PaCO2 48 -> 74. This is consistent with brain death. Time of death: 1631 on 4/2/25.

## 2025-04-02 NOTE — PROGRESS NOTES
Patient Name: Raymon Causey   MRN: [id number]   Admission Date: 3/29/2025   Hospital Length of Stay: 4   Attending Provider: Davion Ledezma MD   Consulting Provider: Rustam Bae MD    Primary Care Physician:  No primary care provider on file.     Principal Problem: <principal problem not specified>       Final diagnoses:  [R07.9] Chest pain  [I21.3] ST elevation myocardial infarction (STEMI), unspecified artery (Primary)  [I46.9] Cardiac arrest      Goals of care review:    We met with the patient's wife and daughter who were at bedside to the patient.  The patient's wife was very sad and emotional after hearing the news that the patient's EEG was consistent with brain death and not seizure activity.  The attending physician provided an update to family and explained the poor prognosis.  We provided emotional support.  I explained that the next step might be a cerebral perfusion study or an apnea test.  The patient likely would not require both if either indicates brain death.  I offered to answer any further questions.  The patient's family felt as though they understood everything.  I spoke to the patient's daughter outside of the room to clarify that even if the patient's further studies indicate that the patient is not completely brain dead, a choice for tracheostomy and PEG tube would be a very difficult road for the patient considering the high risks of recurring hospitalizations and potential suffering.  The patient's daughter stated that she had spoken with her mother and that the family is not going to elect tracheostomy and PEG tube no matter what the outcome.  They will elect a withdrawal of care and a focus on comfort for the patient.  She pointed out that her mother simply wanted to exhaust all options to ensure the patient is in the brain dead.    Symptom review:    Unable to obtain as the patient is nonresponsive.    Assessment/Plan:     Goals of care/counseling  Status post out-of-hospital  cardiac arrest  Persistent anoxic brain injury  Coronary artery disease  Ventricular tachycardia status post defibrillation x2    We will continue to follow up to provide support and education to this family.  Based on today's discussion, family is likely to elect palliative withdrawal in the next couple of days.    Interval History:     See above.      Active Ambulatory Problems     Diagnosis Date Noted    No Active Ambulatory Problems     Resolved Ambulatory Problems     Diagnosis Date Noted    No Resolved Ambulatory Problems     No Additional Past Medical History        Past Surgical History:   Procedure Laterality Date    ANGIOGRAM, CORONARY, WITH LEFT HEART CATHETERIZATION N/A 3/29/2025    Procedure: Angiogram, Coronary, with Left Heart Cath;  Surgeon: Escobar Ashley Jr., MD;  Location: [facility] CATH LAB;  Service: Cardiology;  Laterality: N/A;    RIGHT HEART CATHETERIZATION Right 3/29/2025    Procedure: INSERTION, CATHETER, RIGHT HEART;  Surgeon: Escobar Ashley Jr., MD;  Location: [facility] CATH LAB;  Service: Cardiology;  Laterality: Right;        Review of patient's allergies indicates:  Not on File     Current Medications[1]       Current Facility-Administered Medications:     acetaminophen, 650 mg, Oral, Q4H PRN    calcium gluconate IVPB, 1 g, Intravenous, PRN    calcium gluconate IVPB, 2 g, Intravenous, PRN    calcium gluconate IVPB, 3 g, Intravenous, PRN    dextrose 50%, 12.5 g, Intravenous, PRN    [] fentaNYL, 50 mcg, Intravenous, Q15 Min PRN **FOLLOWED BY** fentaNYL, 50 mcg, Intravenous, Q1H PRN    fentaNYL, 50 mcg, Intravenous, Q1H PRN    glucagon (human recombinant), 1 mg, Intramuscular, PRN    heparin (PORCINE), 40 Units/kg, Intravenous, PRN    heparin (PORCINE), 30 Units/kg, Intravenous, PRN    hydrALAZINE, 10 mg, Intravenous, Q4H PRN    HYDROcodone-acetaminophen, 1 tablet, Oral, Q4H PRN    insulin aspart U-100, 0-10 Units, Subcutaneous, Q6H PRN    magnesium sulfate 2 g IVPB, 2 g, Intravenous,  "PRN    magnesium sulfate 2 g IVPB, 2 g, Intravenous, PRN    midazolam, , , PRN    morphine, 2 mg, Intravenous, Q4H PRN    ondansetron, 8 mg, Oral, Q8H PRN    potassium chloride in water, 40 mEq, Intravenous, PRN **AND** potassium chloride in water, 60 mEq, Intravenous, PRN **AND** potassium chloride in water, 80 mEq, Intravenous, PRN    sodium chloride 0.9%, 10 mL, Intravenous, PRN    sodium phosphate 15 mmol in D5W 250 mL IVPB, 15 mmol, Intravenous, PRN    sodium phosphate 20.1 mmol in D5W 250 mL IVPB, 20.1 mmol, Intravenous, PRN    sodium phosphate 30 mmol in D5W 250 mL IVPB, 30 mmol, Intravenous, PRN    vancomycin - pharmacy to dose, , Intravenous, pharmacy to manage frequency     No family history on file.       Review of Systems   Unable to perform ROS: Intubated            Objective:   /61   Pulse 88   Temp 97.3 °F (36.3 °C)   Resp 20   Ht 5' 10" (1.778 m)   Wt 124 kg (273 lb 5.9 oz)   SpO2 98%   BMI 39.22 kg/m²      Physical Exam   Constitutional:  Non-toxic appearance. He appears ill.   HENT:   Head: Normocephalic.   Right Ear: External ear normal.   Left Ear: External ear normal.   Nose: Nose normal.   Mouth/Throat: Oropharynx is clear.   Eyes: Conjunctivae are normal.   Cardiovascular: Normal heart sounds and normal pulses. Tachycardia present. Pulmonary:      Effort: Pulmonary effort is normal.      Breath sounds: Normal breath sounds.     Abdominal: Soft. Bowel sounds are normal.   Musculoskeletal:         General: Swelling present.      Right lower leg: Edema present.      Left lower leg: Edema present.   Neurological: He is disoriented.   Skin: Skin is warm.   Vitals reviewed.         Review of Symptoms  Review of Symptoms      Symptom Assessment (ESAS 0-10 Scale)  Unable to complete assessment due to Intubated         Pain Assessment in Advanced Demential Scale (PAINAD)   Breathing - Independent of vocalization:  0  Negative vocalization:  0  Facial expression:  0  Body language:  " 0  Consolability:  0  Total:  0    Psychosocial/Cultural:   See Palliative Psychosocial Note: Yes  Patient's wife stated that the patient was medically noncompliant before event and was resistant to follow with his cardiologist  **Primary  to Follow**  Palliative Care  Consult: No    Spiritual:  F - Teresita and Belief:  Restorationism  A - Address in Care:  Consult       Advance Care Planning   Advance Directives:   Living Will: No    LaPOST: No    Do Not Resuscitate Status: Yes    Medical Power of : No      Decision Making:  Family answered questions and Patient unable to communicate due to disease severity/cognitive impairment  Goals of Care: What is most important right now is to focus on symptom/pain control, quality of life, even if it means sacrificing a little time, improvement in condition but with limits to invasive therapies, comfort and QOL . Accordingly, we have decided that the best plan to meet the patient's goals includes continuing with treatment.                > 50% of 22 min of encounter was spent in chart review, face to face discussion of goals of care, symptom assessment, coordination of care and emotional support.     Rustam Bae MD  Palliative Medicine  [facility] - Observation Unit        [1]   Current Facility-Administered Medications:     acetaminophen tablet 650 mg, 650 mg, Oral, Q4H PRN, Escobar Ashley Jr., MD    calcium gluconate 1 g in NS IVPB (premixed), 1 g, Intravenous, PRN, Ventura Cabrera DO    calcium gluconate 1 g in NS IVPB (premixed), 2 g, Intravenous, PRN, Ventura Cabrera,     calcium gluconate 1 g in NS IVPB (premixed), 3 g, Intravenous, PRN, Ventura Cabrera DO    ceFEPIme injection 2 g, 2 g, Intravenous, Q8H, Davion Ledezma MD, 2 g at 04/02/25 0835    dextrose 50% injection 12.5 g, 12.5 g, Intravenous, PRN, Tray Flores MD    fentaNYL 2500 mcg in 0.9% sodium chloride 250 mL infusion premix, 0-250 mcg/hr,  Intravenous, Continuous, Nir Molina DO    [] fentaNYL 50 mcg/mL injection 50 mcg, 50 mcg, Intravenous, Q15 Min PRN **FOLLOWED BY** fentaNYL 50 mcg/mL injection 50 mcg, 50 mcg, Intravenous, Q1H PRN, Tray Flores MD, 50 mcg at 25 0015    fentaNYL injection 50 mcg, 50 mcg, Intravenous, Q1H PRN, Nir Molina DO    glucagon (human recombinant) injection 1 mg, 1 mg, Intramuscular, PRN, Tray Flores MD    heparin 25,000 units in dextrose 5% (100 units/ml) IV bolus from bag LOW INTENSITY nomogram - LAF, 40 Units/kg, Intravenous, PRN, Satya Moran MD    heparin 25,000 units in dextrose 5% (100 units/ml) IV bolus from bag LOW INTENSITY nomogram - LAF, 30 Units/kg, Intravenous, PRN, Satya Moran MD    heparin 25,000 units in dextrose 5% 250 mL (100 units/mL) infusion LOW INTENSITY nomogram - LAF, 0-40 Units/kg/hr, Intravenous, Continuous, Satya Moran MD, Stopped at 25 2324    hydrALAZINE injection 10 mg, 10 mg, Intravenous, Q4H PRN, Escobar Ashley Jr., MD    HYDROcodone-acetaminophen 5-325 mg per tablet 1 tablet, 1 tablet, Oral, Q4H PRN, Escobar Ashley Jr., MD    hydrocortisone sodium succinate injection 100 mg, 100 mg, Intravenous, Q8H, Nir Molina DO, 100 mg at 25 0509    insulin aspart U-100 injection 0-10 Units, 0-10 Units, Subcutaneous, Q6H PRN, Tray Flores MD, 4 Units at 25 0415    magnesium sulfate 2g in water 50mL IVPB (premix), 2 g, Intravenous, PRN, Ventura Cabrera DO    magnesium sulfate 2g in water 50mL IVPB (premix), 2 g, Intravenous, PRN, Ventura Cabrera DO, Stopped at 25 1758    midazolam injection, , , PRN, Escobar Ashley Jr., MD, 1 mg at 25 2359    morphine injection 2 mg, 2 mg, Intravenous, Q4H PRN, Escobar Ashley Jr., MD    mupirocin 2 % ointment, , Nasal, BID, Tray Flores MD, Given at 25 0801    NORepinephrine 32 mg in 0.9% NaCl 250 mL infusion, 0-3 mcg/kg/min, Intravenous,  Continuous, Tray Flores MD, Last Rate: 0.9 mL/hr at 04/02/25 0652, 0.02 mcg/kg/min at 04/02/25 0652    ondansetron disintegrating tablet 8 mg, 8 mg, Oral, Q8H PRN, Escobar Ashley Jr., MD    pantoprazole injection 40 mg, 40 mg, Intravenous, BID, Amado Hamilton, ANP, 40 mg at 04/02/25 0836    potassium chloride 20 mEq in 100 mL IVPB (FOR CENTRAL LINE ADMINISTRATION ONLY), 40 mEq, Intravenous, PRN, Stopped at 03/31/25 1029 **AND** potassium chloride 20 mEq in 100 mL IVPB (FOR CENTRAL LINE ADMINISTRATION ONLY), 60 mEq, Intravenous, PRN **AND** potassium chloride 20 mEq in 100 mL IVPB (FOR CENTRAL LINE ADMINISTRATION ONLY), 80 mEq, Intravenous, PRN, Ventura Cabrera DO    propofol (DIPRIVAN) 10 mg/mL infusion, 0-50 mcg/kg/min, Intravenous, Continuous, Tray Flores MD, Stopped at 03/31/25 0843    sodium chloride 0.9% flush 10 mL, 10 mL, Intravenous, PRN, Tray Flores MD    sodium phosphate 15 mmol in D5W 250 mL IVPB, 15 mmol, Intravenous, PRN, Ventura Cabrera DO    sodium phosphate 20.1 mmol in D5W 250 mL IVPB, 20.1 mmol, Intravenous, PRN, Ventura Cabrera DO    sodium phosphate 30 mmol in D5W 250 mL IVPB, 30 mmol, Intravenous, PRN, Ventura Cabrera DO    vancomycin (VANCOCIN) 1,000 mg in 0.9% NaCl 250 mL IVPB (admixture device), 1,000 mg, Intravenous, Q12H, Davion Ledezma MD, Stopped at 04/02/25 1015    vancomycin - pharmacy to dose, , Intravenous, pharmacy to manage frequency, REYNA To MD    vasopressin (PITRESSIN) 0.2 Units/mL in 0.9% NaCl 100 mL infusion, 0.04 Units/min, Intravenous, Continuous, Nir Molina DO, Last Rate: 12 mL/hr at 04/02/25 0953, 0.04 Units/min at 04/02/25 0953

## 2025-04-02 NOTE — PROGRESS NOTES
[facility] - 7th Floor ICU  Pulmonary Critical Care Note    Patient Name: Raymon Causey  MRN: [id number]  Admission Date: 3/29/2025  Hospital Length of Stay: 4 days  Code Status: DNR  Attending Provider: Davion Ledezma MD  Primary Care Provider: No primary care provider on file.     Subjective:     HPI: Raymon Causey is a 71 yo male with PMH of HTN and HLD who was found to be in cardiac arrest with PEA outside the hospital. EMS arrived where ACLS was initiated with multiple rounds of compressions and medications were administered with eventual achievement of ROSC after 45 minutes. Patient was brought to [facility] ED for further evaluation. Pan CT scan was performed including CT head without acute abnormalities noted. EKG was obtained which was read as STEMI and code STEMI was activated. Interventional Cardiology took patient to the cath lab for [facility] where he was found to have LAD 80% occlusion along with moderate diffuse disease of his Left Main and RCA. After the procedure, patient had 2 episodes of Vtach while still in the cath lab where patient was shocked twice with resolution of Vtach. Patient was noted to have a H/H 6.5/19.2 and started on blood transfusion. Patient was then admitted to the ICU for further management.      Hospital Course/Significant events:  03/30/25: Admitted to ICU after outside cardiac arrest; STEMI activated with [facility] showing chronic CAD.    24 Hour Interval History:  EEG essentially flat yesterday.  Hypothermic to 93.7 F this morning.  Remains on Levophed and vasopressin.  Intubated but off sedation.  1.5 L of urine output yesterday.  Cultures remain no growth to date.    No past medical history on file.    Past Surgical History:   Procedure Laterality Date    ANGIOGRAM, CORONARY, WITH LEFT HEART CATHETERIZATION N/A 3/29/2025    Procedure: Angiogram, Coronary, with Left Heart Cath;  Surgeon: Escobar Ashley Jr., MD;  Location: [facility] CATH LAB;  Service: Cardiology;  Laterality:  N/A;    RIGHT HEART CATHETERIZATION Right 3/29/2025    Procedure: INSERTION, CATHETER, RIGHT HEART;  Surgeon: Escobar Ashley Jr., MD;  Location: [facility] CATH LAB;  Service: Cardiology;  Laterality: Right;       Social History[1]        Current Outpatient Medications   Medication Instructions    amLODIPine (NORVASC) 10 mg, Daily    metoprolol succinate (TOPROL-XL) 100 mg, Daily    rosuvastatin (CRESTOR) 20 mg, Nightly    valsartan (DIOVAN) 320 mg, Daily       Review of patient's allergies indicates:  Not on File     Current Inpatient Medications   ceFEPime IV (PEDS and ADULTS)  2 g Intravenous Q8H    hydrocortisone sodium succinate  100 mg Intravenous Q8H    mupirocin   Nasal BID    pantoprazole  40 mg Intravenous BID    vancomycin (VANCOCIN) 1,000 mg in 0.9% NaCl 250 mL IVPB (admixture device)  1,000 mg Intravenous Q12H       Current Intravenous Infusions   fentanyl  0-250 mcg/hr Intravenous Continuous        heparin (porcine) in D5W  0-40 Units/kg/hr Intravenous Continuous   Stopped at 03/29/25 2324    NORepinephrine bitartrate-D5W  0-3 mcg/kg/min Intravenous Continuous 0.9 mL/hr at 04/02/25 0652 0.02 mcg/kg/min at 04/02/25 0652    propofoL  0-50 mcg/kg/min Intravenous Continuous   Stopped at 03/31/25 0843    vasopressin  0.04 Units/min Intravenous Continuous 12 mL/hr at 04/02/25 0953 0.04 Units/min at 04/02/25 0953         Review of Systems   Unable to perform ROS: Intubated          Objective:       Intake/Output Summary (Last 24 hours) at 4/2/2025 1144  Last data filed at 4/2/2025 1000  Gross per 24 hour   Intake 3134.74 ml   Output 1625 ml   Net 1509.74 ml         Vital Signs (Most Recent):  Temp: (!) 95.7 °F (35.4 °C) (04/02/25 1115)  Pulse: 83 (04/02/25 1115)  Resp: 20 (04/02/25 1115)  BP: 130/60 (04/02/25 1115)  SpO2: 98 % (04/02/25 1115)  Body mass index is 39.22 kg/m².  Weight: 124 kg (273 lb 5.9 oz) Vital Signs (24h Range):  Temp:  [93.4 °F (34.1 °C)-97.9 °F (36.6 °C)] 95.7 °F (35.4 °C)  Pulse:  [75-98]  83  Resp:  [8-24] 20  SpO2:  [94 %-100 %] 98 %  BP: (122-161)/(51-81) 130/60  Arterial Line BP: ()/() 115/40     Physical Exam  Vitals reviewed.   Constitutional:       Comments: Arctic sun cooling device in place   Cardiovascular:      Rate and Rhythm: Normal rate and regular rhythm.   Pulmonary:      Effort: No respiratory distress.      Breath sounds: No wheezing or rales.      Comments: Synchronous with the ventilator  Abdominal:      General: Abdomen is flat.      Palpations: Abdomen is soft.   Musculoskeletal:      Right lower leg: No edema.      Left lower leg: No edema.   Neurological:      Comments: Intubated and off sedation for over 24 hours.  He does not withdraw to pain.  Pupils are nonreactive to light. He does not have a cough, gag or corneal reflexes. No initiation of breaths.           Lines/Drains/Airways       Central Venous Catheter Line  Duration             Percutaneous Central Line - Triple Lumen  03/30/25 0820 Internal Jugular Left 3 days              Drain  Duration                  NG/OG Tube 03/29/25 2201 18 Fr. Center mouth 3 days         Rectal Tube 03/30/25 0300 3 days         Urethral Catheter 03/30/25 0700 16 Fr. 3 days              Airway  Duration                  Airway - Non-Surgical 03/29/25 2152 3 days              Arterial Line  Duration             Arterial Line 03/30/25 0300 Left 3 days              Peripheral Intravenous Line  Duration                  Peripheral IV - Single Lumen 03/29/25 2157 18 G Left;Posterior Hand 3 days         Peripheral IV - Single Lumen 03/30/25 1339 20 G Anterior;Left Upper Arm 2 days                    Significant Labs:    Lab Results   Component Value Date    WBC 27.26 (H) 04/02/2025    HGB 7.3 (L) 04/02/2025    HCT 21.2 (L) 04/02/2025    MCV 74.1 (L) 04/02/2025     04/02/2025           BMP  Lab Results   Component Value Date     04/02/2025    K 3.9 04/02/2025    CO2 32 (H) 04/02/2025    BUN 37.2 (H) 04/02/2025     CREATININE 1.26 (H) 04/02/2025    CALCIUM 7.9 (L) 04/02/2025    AGAP 6.0 04/02/2025         ABG  Recent Labs   Lab 04/02/25 0450   PH 7.450   PO2 88.0   PCO2 53.0*   HCO3 36.8*   POCBASEDEF 11.60*       Mechanical Ventilation Support:  Vent Mode: A/C (04/02/25 0415)  Ventilator Initiated: Yes (03/29/25 2155)  Set Rate: 16 BPM (04/02/25 0415)  Vt Set: 480 mL (04/02/25 0415)  PEEP/CPAP: 5 cmH20 (04/02/25 0415)  Oxygen Concentration (%): 40 (04/02/25 0800)  Peak Airway Pressure: 24 cmH20 (04/02/25 0415)  Total Ve: 6.2 L/m (04/02/25 0415)  F/VT Ratio<105 (RSBI): (!) 41.24 (04/02/25 0415)      Significant Imaging:  I have reviewed the pertinent imaging within the past 24 hours.        Assessment/Plan:     Assessment  Out of hospital cardiac arrest with VT rhythm s/p defibrillation with chronic CAD on [facility] 3/30  Poor neurologic status after cardiac arrest  Undifferentiated shock  Lactic acidosis  Anion gap metabolic acidosis due to above  CAD s/p [facility] w/ LAD lesion 80% occlusion  Hx of HTN and HLD      Plan  Continue mechanical ventilation. Off sedation  Warm patient to 89 F with Amilcar Hugger and start brain death testing  Continue vancomycin and cefepime. Follow up cultures  Wean vasopressors as tolerated for MAP > 65 mmHg  Continue stress dose steroids  Trend CBC b.i.d. Transfuse for Hb < 7  Appreciate cardiology recommendations.  Pursue CABG eval pending clinical improvement  Plastic surgery following for nasal fracture    DVT Prophylaxis: SCDs  GI Prophylaxis: Pantoprazole    Discussed plan of care with wife at bedside.     This patient remains at high risk of decompensation and death and will remain in ICU level care.    32 minutes of critical care was time spent personally by me on the following activities: development of treatment plan with patient or surrogate and bedside caregivers, discussions with consultants, evaluation of patient's response to treatment, examination of patient, ordering and performing  treatments and interventions, ordering and review of laboratory studies, ordering and review of radiographic studies, pulse oximetry, re-evaluation of patient's condition.  This critical care time did not overlap with that of any other provider or involve time for any procedures.    This note was generated via [employer] and may contain some voice recognition errors.       Davion Ledezma MD  Pulmonary Critical Care Medicine  [facility] - 7th Floor ICU  DOS: 04/02/2025          [1]   Social History  Socioeconomic History    Marital status:      Social Drivers of Health     Financial Resource Strain: Patient Unable To Answer (3/31/2025)    Overall Financial Resource Strain (CARDIA)     Difficulty of Paying Living Expenses: Patient unable to answer   Food Insecurity: Patient Unable To Answer (3/31/2025)    Hunger Vital Sign     Worried About Running Out of Food in the Last Year: Patient unable to answer     Ran Out of Food in the Last Year: Patient unable to answer   Transportation Needs: Patient Unable To Answer (3/30/2025)    PRAPARE - Transportation     Lack of Transportation (Medical): Patient unable to answer     Lack of Transportation (Non-Medical): Patient unable to answer   Stress: Patient Unable To Answer (3/31/2025)    [employer] - Occupational Stress Questionnaire     Feeling of Stress : Patient unable to answer   Housing Stability: Patient Unable To Answer (3/31/2025)    Housing Stability Vital Sign     Unable to Pay for Housing in the Last Year: Patient unable to answer     Homeless in the Last Year: Patient unable to answer

## 2025-04-02 NOTE — PLAN OF CARE
Problem: Infection  Goal: Absence of Infection Signs and Symptoms  Outcome: Not Progressing     Problem: Adult Inpatient Plan of Care  Goal: Plan of Care Review  Outcome: Progressing  Goal: Patient-Specific Goal (Individualized)  Outcome: Not Progressing  Goal: Absence of Hospital-Acquired Illness or Injury  Outcome: Progressing  Goal: Optimal Comfort and Wellbeing  Outcome: Progressing  Goal: Readiness for Transition of Care  Outcome: Not Progressing     Problem: Skin Injury Risk Increased  Goal: Skin Health and Integrity  Outcome: Not Progressing     Problem: Delirium  Goal: Optimal Coping  Outcome: Not Progressing  Goal: Improved Behavioral Control  Outcome: Not Progressing  Goal: Improved Attention and Thought Clarity  Outcome: Not Progressing  Goal: Improved Sleep  Outcome: Not Progressing     Problem: Fall Injury Risk  Goal: Absence of Fall and Fall-Related Injury  Outcome: Not Progressing     Problem: Wound  Goal: Optimal Coping  Outcome: Not Progressing  Goal: Optimal Functional Ability  Outcome: Not Progressing  Goal: Absence of Infection Signs and Symptoms  Outcome: Not Progressing  Goal: Improved Oral Intake  Outcome: Not Progressing  Goal: Optimal Pain Control and Function  Outcome: Not Progressing  Goal: Skin Health and Integrity  Outcome: Not Progressing  Goal: Optimal Wound Healing  Outcome: Not Progressing     Problem: Coping Ineffective  Goal: Effective Coping  Outcome: Not Progressing

## 2025-04-03 NOTE — DISCHARGE SUMMARY
[facility]  Pulmonology/Critical Care  Discharge Summary      Patient Name: Raymon Causey  MRN: [id number]  Admission Date: 3/29/2025  Hospital Length of Stay: 4 days  Discharge Date and Time: Patient death pronounced at 2058 on 04/02/2025 by Dr. Tray Flores    Reason for admission: <principal problem not specified>    Primary (Principal), Secondary, Final Diagnoses:  1. ST elevation myocardial infarction (STEMI), unspecified artery    2. Chest pain    3. Cardiac arrest    4. Tachyarrhythmia    5. CAD (coronary artery disease)      Procedures performed: Angiogram, Coronary, with Left Heart Cath (N/A), INSERTION, CATHETER, RIGHT HEART (Right)    Care, treatment & services provided:    Orders Placed This Encounter   Procedures    Critical Care    INTUBATION    PACK PUP KERMIT    Blood Culture    Blood Culture    Urine culture    X-Ray Pelvis Routine AP    CT Head Without Contrast    CT Cervical Spine Without Contrast    CT Chest Abdomen Pelvis With IV Contrast (XPD) NO Oral Contrast    X-Ray Chest 1 View    X-Ray Chest 1 View    RT Blood Gas    Urinalysis, Reflex to Urine Culture    Drug Screen, Urine    APTT    CBC auto differential    Comprehensive metabolic panel    Ethanol    Lactic acid, plasma    Lipase    Magnesium    Protime-INR    Troponin I    CBC with Differential    CBC auto differential    RT Blood Gas (unchecked)    Blood Smear Microscopic Exam    RT Blood Gas    CBC with Differential    CK    Protime-INR    Neuron Specific Enolase, Serum    Glucose, random    Lactic Acid, Plasma    RT Blood Gas    Lactic Acid, Plasma    Troponin I    Troponin I    Comprehensive Metabolic Panel    CBC with Differential    Manual Differential    Lactic Acid, Plasma    Blood Gas    Vancomycin, Random    Magnesium    Phosphorus    CBC Without Differential    Lactic Acid, Plasma    Comprehensive Metabolic Panel    CBC with Differential    VANCOMYCIN, TROUGH    CBC with Differential    VANCOMYCIN,  TROUGH    Blood Gas    RT Blood Gas    RT Blood Gas    Nasogastric/Orogastric tube insertion    Nasogastric/Orogastric tube maintenance    Draw aPTT level 6 hours after start of infusion; adjust dosage and order aPTT based on the nomogram in hyperlink    Do not administer any intramuscular injections, call physician for an alternative route or medication if medication is needed    If bleeding occurs, or HIT is suspected, stop heparin infusion and contact physician    Tele: Maintain IV access while on telemetry - Adult    Vital signs    Cardiac Monitoring - Adult    Assess for bleeding    Assess neurologic status    Vascular access site assessment and bilateral pulses    Collagen closure plug/device    Notify Physician    Notify physician    Notify physician     Notify physician    Notify physician     Notify physician     Select Target Temperature    Vital signs    Document temperature    Notify physician    Check temperature    Normothermia- Maintain cooling device and pads in place. Once normothermia is reached, continue at or below 37 for up to 48 hrs.    Cooling Device    Upon patient transfer, may continue infusion at the currently infusing rate    Perform Roque Agitation Sedation Scale (RASS) hourly    Target arousal level - RASS: RASS -1 to -2    Upon patient transfer, may continue infusion at the currently infusing rate    Upon patient transfer, may continue infusion at the currently infusing rate    Oral care    Elevate HOB 30 degrees    Place sequential compression device    If any glucose result is less than 50 or greater than 400:    If 2nd result is less than 50 or greater than 400:    If glucose greater than 400 mg/dL treat per correction scale.  If glucose remains elevated above 400 mg/dL at next scheduled check, notify provider    Re-check Blood Glucose    Nursing communication    Upon patient transfer, may continue infusion at the currently infusing rate    Medley to [place]    Medley Catheter Care  every 12 hours    Nurse to discontinue phillips when patient no longer meets criteria    Post Phillips Catheter Removal Protocol    Skin assessment    Moisture Management    Sacral Protection    Elevate heels off of bed    Check Medical Devices for Pressure    DNR (Do Not Resuscitate)    Inpatient consult to Plastic Surgery    Inpatient consult to Cardiology    Pharmacy to dose Vancomycin consult    Inpatient consult to Palliative Care    Inpatient consult to Spiritual Care    Tube Feedings/Formulas 85; 1,700; Peptamen Intense VHP; OG (start at 25 ml/hr, advance by 20 ml/hr every 4 hours as tolerated); 30; Every 4 hours    Pulse Oximetry Q4H    EKG 12-lead    EKG 12-LEAD    EKG 12-lead    EKG 12-lead    Echo Saline Bubble? No; Ultrasound enhancing contrast? Yes    Type & Screen    ABORH RETYPE    EEG    Admit to Inpatient    Admit to Inpatient    Prepare RBC     Death note: Came to bedside to evaluate patient.  On exam patient has no pupillary response, oculocephalic reflex is not present.  No response to pain in all 4 extremities.  No heart sounds on auscultation.  No peripheral pulses felt.    Cause of Death: Acute hypoxic respiratory failure due to severe neurological anoxic brain injury    Discharged Condition:     Disposition:  in medical facility      Tray Flores MD  Pulmonology

## 2025-04-04 LAB
BACTERIA BLD CULT: NORMAL
BACTERIA BLD CULT: NORMAL

## 2025-04-07 NOTE — PHYSICIAN QUERY
Please document your best medical opinion regarding the etiology of diagnosis: Cardiac Arrest    Other etiology (please specify): Unknown

## 2025-04-07 NOTE — PHYSICIAN QUERY
Question: Please clarify the Cardiac diagnosis.    Provider Query Response:  Other cardiac diagnosis (please specify): STEMI with non-obstructive CAD on left heart catheterization

## (undated) DEVICE — CATH IMPULSE IM 5FR 125CM

## (undated) DEVICE — SUT SILK 0 BLK BR CT-1 30IN

## (undated) DEVICE — KIT MINI STK MAX COAX 5FR 10CM

## (undated) DEVICE — STOPCOCK 3-WAY

## (undated) DEVICE — CATH IMPULSE MP 5FR 145CM

## (undated) DEVICE — SET ANGIO ACIST CVI ANGIOTOUCH

## (undated) DEVICE — CATH SWAN GANZ STND 7FR

## (undated) DEVICE — DEVICE INDEFLATOR BASIX

## (undated) DEVICE — SHEATH INTRODUCER 7FR 11CM

## (undated) DEVICE — PAD DEFIB CADENCE ADULT R2

## (undated) DEVICE — SHEATH INTRODUCER 6FR 11CM

## (undated) DEVICE — GUIDEWIRE INQWIRE SE 3MM JTIP

## (undated) DEVICE — VALVE CONTROL COPILOT

## (undated) DEVICE — Device